# Patient Record
Sex: MALE | Race: WHITE
[De-identification: names, ages, dates, MRNs, and addresses within clinical notes are randomized per-mention and may not be internally consistent; named-entity substitution may affect disease eponyms.]

---

## 2017-06-09 ENCOUNTER — HOSPITAL ENCOUNTER (OUTPATIENT)
Dept: HOSPITAL 80 - FIMAGING | Age: 82
Discharge: HOME | End: 2017-06-09
Attending: INTERNAL MEDICINE
Payer: COMMERCIAL

## 2017-06-09 DIAGNOSIS — Z95.1: ICD-10-CM

## 2017-06-09 DIAGNOSIS — I25.10: ICD-10-CM

## 2017-06-09 DIAGNOSIS — M48.06: ICD-10-CM

## 2017-06-09 DIAGNOSIS — M54.16: Primary | ICD-10-CM

## 2017-06-09 DIAGNOSIS — M54.32: ICD-10-CM

## 2017-06-09 PROCEDURE — 3E0R3KZ INTRODUCTION OF OTHER DIAGNOSTIC SUBSTANCE INTO SPINAL CANAL, PERCUTANEOUS APPROACH: ICD-10-PCS | Performed by: RADIOLOGY

## 2017-06-10 ENCOUNTER — HOSPITAL ENCOUNTER (INPATIENT)
Dept: HOSPITAL 80 - FED | Age: 82
LOS: 2 days | Discharge: HOME | DRG: 249 | End: 2017-06-12
Attending: INTERNAL MEDICINE | Admitting: INTERNAL MEDICINE
Payer: COMMERCIAL

## 2017-06-10 DIAGNOSIS — I10: ICD-10-CM

## 2017-06-10 DIAGNOSIS — K21.9: ICD-10-CM

## 2017-06-10 DIAGNOSIS — L97.509: ICD-10-CM

## 2017-06-10 DIAGNOSIS — R73.9: ICD-10-CM

## 2017-06-10 DIAGNOSIS — I48.91: ICD-10-CM

## 2017-06-10 DIAGNOSIS — I21.4: Primary | ICD-10-CM

## 2017-06-10 DIAGNOSIS — Z95.1: ICD-10-CM

## 2017-06-10 DIAGNOSIS — I44.7: ICD-10-CM

## 2017-06-10 DIAGNOSIS — N40.0: ICD-10-CM

## 2017-06-10 DIAGNOSIS — I25.10: ICD-10-CM

## 2017-06-10 DIAGNOSIS — E78.5: ICD-10-CM

## 2017-06-10 LAB
% IMMATURE GRANULYOCYTES: 0.4 % (ref 0–1.1)
% IMMATURE GRANULYOCYTES: 0.6 % (ref 0–1.1)
ABSOLUTE IMMATURE GRANULOCYTES: 0.06 10^3/UL (ref 0–0.1)
ABSOLUTE IMMATURE GRANULOCYTES: 0.08 10^3/UL (ref 0–0.1)
ABSOLUTE NRBC COUNT: 0 10^3/UL (ref 0–0.01)
ABSOLUTE NRBC COUNT: 0 10^3/UL (ref 0–0.01)
ADD DIFF?: NO
ADD DIFF?: NO
ADD MORPH?: NO
ADD MORPH?: NO
ADD SCAN?: NO
ADD SCAN?: NO
ANION GAP SERPL CALC-SCNC: 12 MEQ/L (ref 8–16)
APTT BLD: 27.9 SEC (ref 23–38)
ATYPICAL LYMPHOCYTE FLAG: 0 (ref 0–99)
ATYPICAL LYMPHOCYTE FLAG: 10 (ref 0–99)
CALCIUM SERPL-MCNC: 9.8 MG/DL (ref 8.5–10.4)
CHLORIDE SERPL-SCNC: 102 MEQ/L (ref 97–110)
CO2 SERPL-SCNC: 24 MEQ/L (ref 22–31)
CREAT SERPL-MCNC: 1.3 MG/DL (ref 0.7–1.3)
ERYTHROCYTE [DISTWIDTH] IN BLOOD BY AUTOMATED COUNT: 13.2 % (ref 11.5–15.2)
ERYTHROCYTE [DISTWIDTH] IN BLOOD BY AUTOMATED COUNT: 13.2 % (ref 11.5–15.2)
FRAGMENT RBC FLAG: 0 (ref 0–99)
FRAGMENT RBC FLAG: 0 (ref 0–99)
GFR SERPL CREATININE-BSD FRML MDRD: 52 ML/MIN/{1.73_M2}
GLUCOSE SERPL-MCNC: 122 MG/DL (ref 70–100)
HCT VFR BLD CALC: 43.7 % (ref 40–51)
HCT VFR BLD CALC: 46.6 % (ref 40–51)
HGB BLD-MCNC: 14.6 G/DL (ref 13.7–17.5)
HGB BLD-MCNC: 15.5 G/DL (ref 13.7–17.5)
INR PPP: 1.05 (ref 0.83–1.16)
LEFT SHIFT FLG: 0 (ref 0–99)
LEFT SHIFT FLG: 0 (ref 0–99)
LIPEMIA HEMOLYSIS FLAG: 80 (ref 0–99)
LIPEMIA HEMOLYSIS FLAG: 80 (ref 0–99)
MCH RBC BLDCO QN: 30.5 PG (ref 27.9–34.1)
MCH RBC BLDCO QN: 30.6 PG (ref 27.9–34.1)
MCHC RBC AUTO-ENTMCNC: 33.3 G/DL (ref 32.4–36.7)
MCHC RBC AUTO-ENTMCNC: 33.4 G/DL (ref 32.4–36.7)
MCV RBC AUTO: 91.4 FL (ref 81.5–99.8)
MCV RBC AUTO: 91.9 FL (ref 81.5–99.8)
NRBC-AUTO%: 0 % (ref 0–0.2)
NRBC-AUTO%: 0 % (ref 0–0.2)
PLATELET # BLD: 260 10^3/UL (ref 150–400)
PLATELET # BLD: 294 10^3/UL (ref 150–400)
PLATELET CLUMPS FLAG: 0 (ref 0–99)
PLATELET CLUMPS FLAG: 0 (ref 0–99)
PMV BLD AUTO: 9.3 FL (ref 8.7–11.7)
PMV BLD AUTO: 9.9 FL (ref 8.7–11.7)
POTASSIUM SERPL-SCNC: 4.8 MEQ/L (ref 3.5–5.2)
PROTHROMBIN TIME: 13.6 SEC (ref 12–15)
RBC # BLD AUTO: 4.78 10^6/UL (ref 4.4–6.38)
RBC # BLD AUTO: 5.07 10^6/UL (ref 4.4–6.38)
SODIUM SERPL-SCNC: 138 MEQ/L (ref 134–144)
TROPONIN I SERPL-MCNC: 0.07 NG/ML (ref 0–0.03)

## 2017-06-10 PROCEDURE — C1887 CATHETER, GUIDING: HCPCS

## 2017-06-10 PROCEDURE — G0378 HOSPITAL OBSERVATION PER HR: HCPCS

## 2017-06-10 PROCEDURE — C1884 EMBOLIZATION PROTECT SYST: HCPCS

## 2017-06-10 PROCEDURE — C1876 STENT, NON-COA/NON-COV W/DEL: HCPCS

## 2017-06-10 PROCEDURE — C1769 GUIDE WIRE: HCPCS

## 2017-06-10 PROCEDURE — C1760 CLOSURE DEV, VASC: HCPCS

## 2017-06-10 RX ADMIN — PANTOPRAZOLE SODIUM SCH MG: 40 TABLET, DELAYED RELEASE ORAL at 18:07

## 2017-06-10 RX ADMIN — EZETIMIBE SCH MG: 10 TABLET ORAL at 18:07

## 2017-06-10 RX ADMIN — THERA TABS SCH EACH: TAB at 18:07

## 2017-06-10 RX ADMIN — TAMSULOSIN HYDROCHLORIDE SCH MG: 0.4 CAPSULE ORAL at 18:07

## 2017-06-10 RX ADMIN — Medication SCH MG: at 18:07

## 2017-06-10 RX ADMIN — ATORVASTATIN CALCIUM SCH MG: 20 TABLET, FILM COATED ORAL at 18:07

## 2017-06-10 RX ADMIN — MOMETASONE FUROATE SCH PUFFS: 220 INHALANT RESPIRATORY (INHALATION) at 20:42

## 2017-06-10 RX ADMIN — DIGOXIN SCH MCG: 125 TABLET ORAL at 18:07

## 2017-06-10 RX ADMIN — ASPIRIN SCH MG: 325 TABLET, FILM COATED ORAL at 18:07

## 2017-06-10 RX ADMIN — CARVEDILOL SCH MG: 6.25 TABLET, FILM COATED ORAL at 18:08

## 2017-06-10 RX ADMIN — CARVEDILOL SCH MG: 6.25 TABLET, FILM COATED ORAL at 18:07

## 2017-06-10 NOTE — CPEKG
Heart Rate: 58

RR Interval: 1034

P-R Interval: 216

QRSD Interval: 136

QT Interval: 404

QTC Interval: 397

P Axis: -7

QRS Axis: -59

T Wave Axis: 152

EKG Severity - ABNORMAL ECG -

EKG Impression: SINUS RHYTHM

EKG Impression: VENTRICULAR TRIGEMINY

EKG Impression: LEFT BUNDLE BRANCH BLOCK

Electronically Signed By: Glen Miles 10-Tyler-2017 11:55:39

## 2017-06-10 NOTE — PDCARCONS
Cardiology Consult


Reason for Consult: Chest pain


Chief Complaint: Chest pain


Requesting Physician: Dr. Antoine


History of Present Illness: 





86 M with prior h.o. CABG and MV repair.


This AM developed chest pain, described as left precordial.  8/10, no 

radiation.  No relieving factors.


Currently has mild to no pain (1/10), resting comfortably in bed.


I was called ~ 7PM by Dr. Antoine requesting consult on this patient due to 

positive troponin.


Wife is in room at time of this interview





History Information





- Allergies/Home Medication List


Allergies/Adverse Reactions: 








codeine Allergy (Verified 07/02/14 21:11)


 





Home Medications: 








Digoxin [Lanoxin 0.125 mg] 250 mcg PO MOTUWETHFRSA@1800 07/29/11 [Last Taken 06/ 09/17]


Nitroglycerin [Nitrostat 0.4 mg (*)] 0.4 mg SL PRN PRN 07/29/11 [Last Taken 06/

10/17]


Omeprazole [Prilosec 20 mg] 20 mg PO DAILY@1800 07/29/11 [Last Taken 06/09/17]


Tamsulosin HCl [Flomax 0.4 MG (*)] 0.4 mg PO DAILY@1800 07/29/11 [Last Taken 06/ 09/17]


Aspirin [Aspirin 325 mg (*)] 325 mg PO DAILY@1800 07/02/14 [Last Taken 06/09/17]


Mometasone 220Mcg Inhaler [Asmanex Inh (*)] 2 puffs IH BID 07/02/14 [Last Taken 

06/10/17]


Multivitamins [Multivitamin (*)] 1 each PO DAILY@1800 07/02/14 [Last Taken 06/09 /17]


Calcium Carb W/Vit D [Calcium Carb W/Vit D 500/200 (*)] 1,000 mg PO DAILY@18 06/

10/17 [Last Taken 06/09/17]


Carvedilol [Coreg (*)] 6.25 mg PO DAILY 06/10/17 [Last Taken 06/10/17]


Carvedilol [Coreg (*)] 12.5 mg PO DAILY@18 06/10/17 [Last Taken 06/09/17]


Ezetimibe/Simvastatin [Ezetimibe-Simvastatin 10-40 mg] 1 each PO DAILY@18 06/10/

17 [Last Taken 06/09/17]


Lisinopril [Zestril 5 mg (*)] 5 mg PO MWF@0900 06/10/17 [Last Taken 06/09/17]





I have personally reviewed and updated: family history, medical history, social 

history (d)





- Past Medical History


coronary artery disease





- Surgical History


Reports: coronary bypass surgery





- Social History


Smoking Status: Never smoked





Cardiac History





- Cardiac History


Past Cardiac History: CABG (MV repair)





Physical Exam














Temp Pulse Resp BP Pulse Ox


 


 36.6 C   54 L  20   138/72 H  98 


 


 06/10/17 20:00  06/10/17 20:00  06/10/17 20:00  06/10/17 20:00  06/10/17 20:00




















O2 (L/minute)                  2














Constitutional: no apparent distress, appears nourished, not in pain


Eyes: PERRL, anicteric sclera


Ears, Nose, Mouth, Throat: moist mucous membranes (dd), hard of hearing


Cardiovascular: regular rate and rhythym, no murmur, rub, or gallop


Respiratory: no respiratory distress


Gastrointestinal: normoactive bowel sounds, soft, non-tender abdomen


Skin: warm


Neurologic: AAOx3


Psychiatric: interacting appropriately





Lab and Imaging





 06/10/17 Unknown





 06/10/17 Unknown














WBC  13.74 10^3/uL (3.80-9.50)  H  06/10/17  Unknown


 


RBC  5.07 10^6/uL (4.40-6.38)   06/10/17  Unknown


 


Hgb  15.5 g/dL (13.7-17.5)   06/10/17  Unknown


 


Hct  46.6 % (40.0-51.0)   06/10/17  Unknown


 


MCV  91.9 fL (81.5-99.8)   06/10/17  Unknown


 


MCH  30.6 pg (27.9-34.1)   06/10/17  Unknown


 


MCHC  33.3 g/dL (32.4-36.7)   06/10/17  Unknown


 


RDW  13.2 % (11.5-15.2)   06/10/17  Unknown


 


Plt Count  294 10^3/uL (150-400)   06/10/17  Unknown


 


MPV  9.9 fL (8.7-11.7)   06/10/17  Unknown


 


Neut % (Auto)  62.8 % (39.3-74.2)   06/10/17  Unknown


 


Lymph % (Auto)  19.3 % (15.0-45.0)   06/10/17  Unknown


 


Mono % (Auto)  13.5 % (4.5-13.0)  H  06/10/17  Unknown


 


Eos % (Auto)  3.5 % (0.6-7.6)   06/10/17  Unknown


 


Baso % (Auto)  0.5 % (0.3-1.7)   06/10/17  Unknown


 


Nucleat RBC Rel Count  0.0 % (0.0-0.2)   06/10/17  Unknown


 


Absolute Neuts (auto)  8.63 10^3/uL (1.70-6.50)  H  06/10/17  Unknown


 


Absolute Lymphs (auto)  2.65 10^3/uL (1.00-3.00)   06/10/17  Unknown


 


Absolute Monos (auto)  1.85 10^3/uL (0.30-0.80)  H  06/10/17  Unknown


 


Absolute Eos (auto)  0.48 10^3/uL (0.03-0.40)  H  06/10/17  Unknown


 


Absolute Basos (auto)  0.07 10^3/uL (0.02-0.10)   06/10/17  Unknown


 


Absolute Nucleated RBC  0.00 10^3/uL (0-0.01)   06/10/17  Unknown


 


Immature Gran %  0.4 % (0.0-1.1)   06/10/17  Unknown


 


Immature Gran #  0.06 10^3/uL (0.00-0.10)   06/10/17  Unknown


 


PT  13.6 SEC (12.0-15.0)   06/10/17  19:00    


 


INR  1.05  (0.83-1.16)   06/10/17  19:00    


 


APTT  27.9 SEC (23.0-38.0)   06/10/17  19:00    


 


Sodium  138 mEq/L (134-144)   06/10/17  Unknown


 


Potassium  4.8 mEq/L (3.5-5.2)   06/10/17  Unknown


 


Chloride  102 mEq/L ()   06/10/17  Unknown


 


Carbon Dioxide  24 mEq/l (22-31)   06/10/17  Unknown


 


Anion Gap  12 mEq/L (8-16)   06/10/17  Unknown


 


BUN  18 mg/dL (7-23)   06/10/17  Unknown


 


Creatinine  1.3 mg/dL (0.7-1.3)   06/10/17  Unknown


 


Estimated GFR  52   06/10/17  Unknown


 


Glucose  122 mg/dL ()  H  06/10/17  Unknown


 


Calcium  9.8 mg/dL (8.5-10.4)   06/10/17  Unknown


 


Troponin I  0.069 ng/mL (0-0.034)  H  06/10/17  Unknown


 


NT-Pro-B Natriuret Pep  1540 pg/mL (0-450)  H  06/10/17  Unknown








Visualized and Interpreted EKG results: Yes


EKG additional interpertation: NSR, IVCD with lateral STT changes (ST 

depression new, QRS wider than prior ECG)





A/P


Assessment: 





CAD


NSTEMI


Recent attempted epidural injection by IR with breach of subarachnoid space





Plan: 





-Continue ASA


-I have talked with neurologist Dr. Burden.  Have paged IR but not heard 

back from them.  Acc to neurology if patient unstable, OK to use heparin + dual 

antiplatelet.  Will hold off on heparin for now since patient stable and almost 

pain free.  Will keep NPO for now, anticipating cor angio in AM


-ASA for now


-Start low dose BB





Have d.w. Dr. Antoine, Dr. Jarvis, Dr. Burden and patient's nurse

## 2017-06-10 NOTE — GHP
[f rep st]



                                                            HISTORY AND PHYSICAL





DATE OF ADMISSION:  06/10/2017



CHIEF COMPLAINT:  Chest pain.



HISTORY:  This is an 86-year-old man who has a past medical history of coronary artery disease statu
s post CABG, as well as a chronic wound of his right foot, who presents with the abrupt onset of mayra
st pain this morning.  He notes he was not doing anything particular that he can remember when he de
veloped left-sided chest pain.  He notes it was more over his pectoral muscle than over his sternum.
  It did not radiate anywhere.  It began mildly, but then rapidly became severe.  He rated it up to 
a 9/10.  He did have some associated nausea and diaphoresis, as well as a sense of doom.  He notes t
hat when it started initially thought it was indigestion, and when he burped it did improve somewhat
, but then it came back again more significantly.  He took a nitroglycerin which he carries with him
 and this had no change in his symptoms.  He then chewed an aspirin.  He notes at this time the pain
 is still slightly present, but is much reduced, more like a 2/10.  He denies ever having similar sy
mptoms in the past.  He otherwise has felt well recently and the only other concern that he mentions
 is that he did undergo a lumbar injection for sciatic pain yesterday that had to be canceled as the
 needle was placed into the subarachnoid space.



PAST MEDICAL HISTORY:  

1.  Coronary artery disease.

2.  Valvular heart disease.

3.  Atrial fibrillation.

4.  Chronic sciatic pain.

5.  Chronic foot wound.



PAST SURGICAL HISTORY:  

1.  CABG x2.

2.  Mitral valve repair.

3.  Hernia repair.



SOCIAL HISTORY:  The patient is .  He is a nonsmoker, nondrinker, non-drug user.  He is quite
 active.



FAMILY HISTORY:  Noncontributory.



REVIEW OF SYSTEMS:  A 10-point review of systems was obtained and was negative, except as per HPI.



HOME MEDICATIONS:  

1.  Tamsulosin.

2.  Omeprazole.

3.  Nitroglycerin.

4.  Multivitamin.

5.  Mometasone.

6.  Lisinopril.

7.  Ezetimibe/simvastatin.

8.  Digoxin.

9.  Carvedilol.

10.  Calcium and vitamin D.  

11.  Aspirin.



ALLERGIES:  Include codeine.



PHYSICAL EXAMINATION:  VITAL SIGNS:  Blood pressure 132/70, heart rate 62, respiratory rate 19, O2 s
aturations 100% on room air, temperature is 36.4.  GENERAL APPEARANCE:  This is an elderly man.  He 
appears somewhat younger than stated age.  He is awake and alert.  In no acute distress.  HEENT:  Klaus
es:  Anicteric.  ENT:  Oropharynx clear, moist mucous membranes, JVD not elevated.  CARDIOVASCULAR: 
 Regular rate and rhythm.  He does have a systolic murmur present, most prominently at the left uppe
r sternal border.  PULMONARY:  Lung are clear to auscultation bilaterally.  ABDOMEN:  Soft, nontende
r, nondistended.  EXTREMITIES:  No clubbing, cyanosis, or edema.  SKIN:  Warm, dry, well perfused. 

NEURO/PSYCH:  The patient is oriented, appropriate, he is pleasant.



CLINICAL DATA:  Labs reviewed and notable for:

1.  White blood cell count of 13.7.  Troponin is 0.069.  ProBNP of 1500.  Glucose is 122.

2.  EKG personally reviewed and interpreted shows sinus rhythm with a rate of 58.  There is a left b
undle branch block and ventricular trigeminy.

3.  Chest x-ray:  This was personally reviewed and interpreted and shows no acute findings.



ASSESSMENT AND PLAN:  An 86-year-old man with a history of coronary artery disease, presenting with 
chest pain.

1.  Chest pain:  Concerning for acute coronary syndrome, including unstable angina given history and
 that his initial troponin was mildly elevated.  He will be admitted for acute coronary syndrome rul
e out with serial troponins, EKGs, and telemetry monitoring.  Cardiology will be consulted and can d
ecide in the morning if he is more appropriate for coronary angiography versus a stress test.  If hi
s chest pain recurs, we will start heparin drip overnight.

2.  Coronary artery disease:  As per above, this has not been an issue for the patient for quite nancy
e time.  He states at least since his CABG which was many years ago.  He is on statin, aspirin, and 
beta blocker at home, which will be continued.  

3.  Atrial fibrillation:  EKG here personally reviewed interpreted showing sinus rhythm with left bu
ndle branch block.  He is on both a beta-blocker and digoxin, which will be continued.  He is not ch
ronically anticoagulated for this, other than full-dose aspirin, however.  He does have a [id number]
Sc score of 4 and anticoagulation is likely indicated if he can tolerate.

4.  Chronic foot wound:  He has been followed both by Podiatry and previously by the [facility].  We will ask for a Wound consultation while inhouse.  

5.  Hyperglycemia without a history of diabetes:  We will check a hemoglobin A1c.

6.  Gastroesophageal reflux disease:  We will continue PPI.

7.  Disposition:  Observation status.  Should he in fact have acute coronary syndrome, he will requi
re a change to inpatient status at that point.

8.  Code status:  Patient states he would like to be full code.  His wife would be his decision make
r should he be unable to make decisions on his own. 

The patient is new to my care.  Old records reviewed, summarized as per HPI and past medical history
.  Care plan reviewed with ER physician, including plans for monitoring on telemetry.





Job #:  [id number]

## 2017-06-11 LAB
% IMMATURE GRANULYOCYTES: 0.6 % (ref 0–1.1)
ABSOLUTE IMMATURE GRANULOCYTES: 0.09 10^3/UL (ref 0–0.1)
ABSOLUTE NRBC COUNT: 0 10^3/UL (ref 0–0.01)
ADD DIFF?: NO
ADD MORPH?: NO
ADD SCAN?: NO
ANION GAP SERPL CALC-SCNC: 5 MEQ/L (ref 8–16)
ATYPICAL LYMPHOCYTE FLAG: 0 (ref 0–99)
CALCIUM SERPL-MCNC: 9.6 MG/DL (ref 8.5–10.4)
CHLORIDE SERPL-SCNC: 102 MEQ/L (ref 97–110)
CHOLEST SERPL-MCNC: 105 MG/DL (ref 140–220)
CHOLEST/HDLC SERPL: 4.04 RATIO (ref 1–4.97)
CO2 SERPL-SCNC: 29 MEQ/L (ref 22–31)
CREAT SERPL-MCNC: 1.3 MG/DL (ref 0.7–1.3)
DIGOXIN SERPL-MCNC: 2 NG/ML (ref 0.8–2)
ERYTHROCYTE [DISTWIDTH] IN BLOOD BY AUTOMATED COUNT: 13.3 % (ref 11.5–15.2)
FRAGMENT RBC FLAG: 0 (ref 0–99)
GFR SERPL CREATININE-BSD FRML MDRD: 52 ML/MIN/{1.73_M2}
GLUCOSE SERPL-MCNC: 95 MG/DL (ref 70–100)
HCT VFR BLD CALC: 43.6 % (ref 40–51)
HGB BLD-MCNC: 14.6 G/DL (ref 13.7–17.5)
HIGH DENSITY LIPOPROTEIN: 26 MG/DL (ref 40–65)
LDLC/HDLC SERPL: 1.54 RATIO (ref 1–3.64)
LEFT SHIFT FLG: 0 (ref 0–99)
LIPEMIA HEMOLYSIS FLAG: 80 (ref 0–99)
LOW DENSITY LIPOPROTEIN: 40 MG/DL (ref 80–100)
MCH RBC BLDCO QN: 30.9 PG (ref 27.9–34.1)
MCHC RBC AUTO-ENTMCNC: 33.5 G/DL (ref 32.4–36.7)
MCV RBC AUTO: 92.4 FL (ref 81.5–99.8)
NON-HIGH DENSITY LIPOPROTEIN: 79 MG/DL (ref 90–129)
NRBC-AUTO%: 0 % (ref 0–0.2)
PLATELET # BLD: 253 10^3/UL (ref 150–400)
PLATELET CLUMPS FLAG: 0 (ref 0–99)
PMV BLD AUTO: 10.2 FL (ref 8.7–11.7)
POTASSIUM SERPL-SCNC: 4.9 MEQ/L (ref 3.5–5.2)
RBC # BLD AUTO: 4.72 10^6/UL (ref 4.4–6.38)
SODIUM SERPL-SCNC: 136 MEQ/L (ref 134–144)
TRIGL SERPL-MCNC: 198 MG/DL (ref 40–150)
VERY LOW DENSITY LIPOPROTEINS: 39 MG/DL (ref 8–25)

## 2017-06-11 PROCEDURE — B2111ZZ FLUOROSCOPY OF MULTIPLE CORONARY ARTERIES USING LOW OSMOLAR CONTRAST: ICD-10-PCS | Performed by: INTERNAL MEDICINE

## 2017-06-11 PROCEDURE — 02703DZ DILATION OF CORONARY ARTERY, ONE ARTERY WITH INTRALUMINAL DEVICE, PERCUTANEOUS APPROACH: ICD-10-PCS | Performed by: INTERNAL MEDICINE

## 2017-06-11 PROCEDURE — 4A023N7 MEASUREMENT OF CARDIAC SAMPLING AND PRESSURE, LEFT HEART, PERCUTANEOUS APPROACH: ICD-10-PCS | Performed by: INTERNAL MEDICINE

## 2017-06-11 RX ADMIN — TAMSULOSIN HYDROCHLORIDE SCH MG: 0.4 CAPSULE ORAL at 17:25

## 2017-06-11 RX ADMIN — PANTOPRAZOLE SODIUM SCH MG: 40 TABLET, DELAYED RELEASE ORAL at 17:25

## 2017-06-11 RX ADMIN — MOMETASONE FUROATE SCH PUFFS: 220 INHALANT RESPIRATORY (INHALATION) at 08:46

## 2017-06-11 RX ADMIN — THERA TABS SCH EACH: TAB at 17:25

## 2017-06-11 RX ADMIN — CARVEDILOL SCH MG: 6.25 TABLET, FILM COATED ORAL at 17:25

## 2017-06-11 RX ADMIN — ATORVASTATIN CALCIUM SCH MG: 20 TABLET, FILM COATED ORAL at 17:25

## 2017-06-11 RX ADMIN — MOMETASONE FUROATE SCH PUFFS: 220 INHALANT RESPIRATORY (INHALATION) at 19:20

## 2017-06-11 RX ADMIN — ASPIRIN SCH MG: 325 TABLET, FILM COATED ORAL at 17:25

## 2017-06-11 RX ADMIN — EZETIMIBE SCH MG: 10 TABLET ORAL at 17:25

## 2017-06-11 RX ADMIN — DIGOXIN SCH MCG: 125 TABLET ORAL at 17:24

## 2017-06-11 RX ADMIN — Medication SCH MG: at 17:24

## 2017-06-11 NOTE — SOAPPROG
SOAP Progress Note


Assessment/Plan: 


Assessment:





1.  Non STEMI myocardial infarction with troponin of 20.


2. Coronary disease status post 2 vessel coronary bypass grafting.


3. History of mitral valve repair.


4.  Paroxysmal Atrial fibrillation





Impression:  Troponin continues to increase now at 25 with history of 

stuttering chest pain/angina yesterday.  Per Dr. Whalen and interventional 

Radiology and Neurology patient has been cleared for use of anticoagulation 

including sustained dual antiplatelet therapy.  Will plan for diagnostic/

therapeutic angiogram this morning.  Risks and benefits of this approach were 

discussed with the patient his wife.  We will proceed.  Pending results 

considerations for further medical therapy.











06/11/17 10:20





Subjective: 


Minimal discomfort overnight.  This morning he is free of shortness of breath 

PND orthopnea.





Objective: 





Medications











Generic Name Dose Route Start Last Admin





  Trade Name Freq  PRN Reason Stop Dose Admin


 


Aspirin  325 mg  06/10/17 18:00  06/10/17 18:07





  Aspirin  PO  12/07/17 17:59  325 mg





  DAILY@1800 [facility]   


 


Carvedilol  12.5 mg  06/10/17 18:00  06/10/17 18:08





  Coreg  PO  12/07/17 17:59  12.5 mg





  DAILY@18 [facility]   


 


Atorvastatin Calcium  20 mg  06/10/17 18:00  06/10/17 18:07





  Lipitor  PO  12/07/17 17:59  20 mg





  DAILY@1800 [facility]   


 


Carvedilol  6.25 mg  06/11/17 09:00  06/10/17 18:07





  Coreg  PO  12/08/17 08:59  6.25 mg





  DAILY [facility]   


 


Digoxin  250 mcg  06/10/17 18:00  06/10/17 18:07





  Lanoxin  PO  12/07/17 17:59  250 mcg





  MOTUWETHFRSA@1800 [facility]   


 


Ezetimibe  10 mg  06/10/17 18:00  06/10/17 18:07





  Zetia  PO  12/07/17 17:59  10 mg





  DAILY@1800 [facility]   


 


Lisinopril  5 mg  06/12/17 09:00  





  Zestril  PO  12/09/17 08:59  





  MWF@0900 [facility]   











 Vital Signs











Temp Pulse Resp BP Pulse Ox


 


 36.8 C   58 L  16   110/58 L  91 L


 


 06/11/17 08:30  06/11/17 08:30  06/11/17 08:30  06/11/17 08:30  06/11/17 08:30








 Laboratory Results





 06/11/17 04:00 





 06/11/17 04:00 





 











 06/10/17 06/11/17 06/12/17





 05:59 05:59 05:59


 


Intake Total  700 


 


Balance  700 








 











PT  13.6 SEC (12.0-15.0)   06/10/17  19:00    


 


INR  1.05  (0.83-1.16)   06/10/17  19:00    











 Laboratory Tests











  06/10/17 06/10/17 06/11/17





  11:00 17:20 00:05


 


Troponin I  0.069 H  7.880 H  25.200 H


 


NT-Pro-B Natriuret Pep  1540 H  


 


LDL Cholesterol, Calc   














  06/11/17





  04:00


 


Troponin I 


 


NT-Pro-B Natriuret Pep 


 


LDL Cholesterol, Calc  40 L














Physical Exam





- Physical Exam


General Appearance: alert, no apparent distress


EENT: PERRL/EOMI


Neck: non-tender, full range of motion


Respiratory: chest non-tender, lungs clear


Cardiac/Chest: normal peripheral pulses, regular rate, rhythm, No edema, No 

gallop, No JVD


Peripheral Pulses: 1+: carotid (R), carotid (L), dorsalis-pedis (R), dorsalis-

pedis (L), 2+: femoral (R), femoral (L)


Abdomen: normal bowel sounds, non-tender


Back: Normal inspection


Skin: normal color, warm/dry, No rash


Lymphatic: no adenopathy


Extremities: normal range of motion, non-tender, No pedal edema


Neuro/Psych: no motor/sensory deficits, alert, normal mood/affect, oriented x 3





ICD10 Worksheet


Patient Problems: 


 Problems











Problem Status Onset


 


Cellulitis and abscess of foot Acute  


 


Chest pain Acute  














Review of Systems





- Review of Systems


Constitutional: denies: chills, fever


EENTM: no symptoms reported


Respiratory: no symptoms reported


Cardiac: no symptoms reported, chest pain.  denies: irregular heart rate, 

lightheadedness


Gastrointestinal/Abdominal: no symptoms reported


Genitourinary: no symptoms


Musculoskelatal: no symptoms


Skin: no symptoms


Neurological: no symptoms


Hematologic/Lymphatic: no symptoms reported


Immunologic/allergic: no symptoms reported





Past Medical History





- Personal History


Current Tetanus/Diphtheria Vaccine: No


Current Tetanus Diphtheria and Acellular Pertussis (TDAP): No


Tetanus Vaccine Date: 2005





- Medical/Surgical History


Hx Asthma: Yes


Hx Chronic Respiratory Disease: No


Hx Cardiac Disease: Yes


Hx Diabetes: No


Hx Renal Disease: No


Hx Alcoholism: No


Hx Cirrhosis: No


Hx HIV/AIDS: No


Hx Splenectomy or Spleen Trauma: No


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





- Social History


Smoking Status: Never smoked

## 2017-06-11 NOTE — CPEKG
Heart Rate: 79

RR Interval: 759

QRSD Interval: 136

QT Interval: 444

QTC Interval: 510

QRS Axis: -62

T Wave Axis: 119

EKG Severity - ABNORMAL ECG -

EKG Impression: A-FLUTTER W/ PREDOM 3:1 AV BLOCK, A-RATE 245



EKG Impression: LEFT BUNDLE BRANCH BLOCK

Electronically Signed By: Darshan Mccormack 11-Jun-2017 18:33:41

## 2017-06-11 NOTE — PDDXCAT
Diagnostic Cath Note





- .


Date: 06/11/17


: Matheus


Indication: other (Non-Q-wave myocardial infarction)





- Procedure


Access: right groin


Procedure: left heart catheterization, coronary angiography





- Materials


Left Heart Cath size: 6F


Left Heart Cath materials: standard multipack (JL4, JR4, pigtail)





- Findings-Left Heart Catheterization


LM: Unobstructed


LAD: 100% occluded


LCX: Unobstructed


RCA: Dominant:  Unobstructed with luminal irregularities


Ramus: 100% occluded


rSVG: Vein graft to the ramus intermedius with subtotal stenosis in the body of 

the graft with thrombus seen distally.


LIMA: Widely patent to the LAD


Complications: None


Estimated blood loss: <50ml


Closure method: Angioseal


Assessment: NSTEMI myocardial infarction with thrombotic occlusion of a 

degenerative vein graft to the ramus.  Patent mammary artery to the LAD.  

Patent native right coronary artery and circumflex.


Plan: 





Urgent PCI of the vein graft.


Intervention: 





Procedure:  PCI and stenting of the degenerated vein graft to the ramus 

intermedius with filter wire support.


After reviewing diagnostic angiogram shows like to proceed with urgent PCI.  

Patient was anticoagulated with heparin.  Therapeutic ACT was confirmed.  A 6 

[place] left coronary bypass grafting catheter guide catheter was used to 

intubate the vein graft.  Using a 0.014 2.5-3.5 filter wire the vein graft 

stenosis was crossed the filter wire basket was deployed distally.  The lesion 

was primarily stented with a 2.75 x 32 mm Rebel  bare metal stent.  Filter wire 

was withdrawn.  Patient was administered intra of vein graft nitroglycerin.  

Initially following procedure there was spasm of the distal ramus.  This 

resolved with nitroglycerin.  Final orthogonal angiograms revealed ALTAGRACIA grade 3 

flow with no complication.


Conclusions:


Non STEMI myocardial infarction with occlusion of the vein graft to the ramus 

intermedius status post successful PCI and stenting with a bare metal stent.  

Dual antiplatelet therapy required for 14-30 days.  Aggressive secondary 

prevention to continue.


Patient Problems: 


 Problems











Problem Status Onset


 


Chest pain Acute  


 


Cellulitis and abscess of foot Acute

## 2017-06-11 NOTE — CPEKG
Heart Rate: 59

RR Interval: 1017

P-R Interval: 242

QRSD Interval: 138

QT Interval: 412

QTC Interval: 409

P Axis: -40

QRS Axis: -57

T Wave Axis: 143

EKG Severity - ABNORMAL ECG -

EKG Impression: SINUS RHYTHM

EKG Impression: MULTIPLE VENTRICULAR PREMATURE COMPLEXES

EKG Impression: FIRST DEGREE AV BLOCK

EKG Impression: LEFT BUNDLE BRANCH BLOCK

Electronically Signed By: Darshan Mccormack 11-Jun-2017 09:50:47

## 2017-06-11 NOTE — WOCRNPDOC
WOCRN Advanced Assessment Note





- Skin Integrity Problem, Advanced Assess


  ** Right Foot


Dressing Type: Band Aid, Mepilex (non-bordered foam)


Dressing Description: Clean/Dry, Intact


Exudate Amount: Scant


Exudate Color: Reddish/Yellow


Exudate Characteristic(s): Serosanguinous


Integumentary Issue Intervention: Visualized Under Dressing


Laurie Wound Tissue: Hyperkeratotic


Laurie Wound Swelling: None


Wound Bed Color: Red


Wound Bed Constitution: Smooth Tissue


Site Odor: None


Site Measurement - Head-to-Toe Length X Width X Depth (cm): R 1st metatarsal: 

0.8cmx0.2cmx0.2cm.  R 3rd metatarsal: 0.2cmx0.1cmx0.1cm


Skin Integrity Problem Comment: Two, discrete, pressure-related wounds noted on 

plantar aspect of patient's R foot. While the appearance is indicative of 

diabetic foot ulcer, patient does have bony deformity in this foot which may 

have resulted in pressure injuries from shoes/walking. He does not have a dx of 

diabetes, and A1C results are pending. He reports being to many podiatrists and 

to the wound healing center treatment, but also says no one has been able to 

help. He was referred to [facility] to be fitted for a specialty shoe that off-

loads these wounds, but says the shoes resulted in another injury to this foot. 

This author explained that going forward, these wounds will not heal unless the 

areas of pressure are off-loaded. He said he would be open to returning to 

[facility] after discharge for a re-evaluation of the shoes they made for him. 

Nursing may continue w/ existing dressing of Mepilex foam and Band-aid, 

changing q3 days and PRN. Wound care does not need to follow this patient 

ongoing. Report given to Staff CHELSEY Payton.

## 2017-06-11 NOTE — ECHO
[id number]

[id number]



+---------------------------------------------------+      [address]

:                                                   :       [place] [address]

:                                                   :           [phone]

+---------------------------------------------------+       Fax [phone]



                       Adult Echocardiographic Report



+----------------------------------------------------------------------------

----+

:Name: JERROD RAMESH LStudy Date: 2017 08:00 AM                         

    :

:MRN: [id number]     Hospital Admission Number: [id number] Location:

 202:

:: 1930     Gender: Male                           Height: 72 in    

    :

:Age: 86 yrs         Race: WH                               Weight: 147 lb   

    :

:Reason For Study: Chest pain/elevated troponin                              

    :

:                                                           BSA: 1.9 meters2 

    :

:History: CABG/MV repair                                                     

    :

+----------------------------------------------------------------------------

----+

MMode/2D Measurements \T\ Calculations

IVSd: 0.74 cm    LVIDd: 6.5 cm        FS: 34.4 %         Ao root diam:

LVPWd: 0.72 cm   LVIDs: 4.3 cm        EDV(Teich):        3.7 cm

                                      218.2 ml           LA dimension:

                                      ESV(Teich): 82.3 ml4.5 cm

                                      EF(Teich): 62.3 %

        _____________________________________________________________



LVOT diam: 2.6 cmLVLd ap4: 8.5 cm     SV(MOD-sp4):

LVOT area:       EDV(MOD-sp4):        74.0 ml

5.4 cm2          165.0 ml

                 LVLs ap4: 7.7 cm

                 ESV(MOD-sp4):

                 91.0 ml

                 EF(MOD-sp4): 44.8 %



Normal Measurement Values:

+----------------------------------------------------------------------------

----------------------------------+

:LVIDd (3.5-5.7cm)    IVSd (0.6-1.1cm)     LVPWd (0.6-1.1cm)     Aortic Root 

(2.0-3.7cm)Left Atrium (1.5-4.0cm):

:LV Vol(d) (76-115ml) LV Vol(s) (29-48ml)  Ejec Fraction (50-65%)PV Samuel (0.6-

1.2m/s)    TV Samuel (0.4-1.0m/s)    :

:MV E Samuel (0.8-1.0m/s)MV A Samuel (0.3-1.0m/s)LVOT Samuel (0.7-1.2m/s) Asc Ao Samuel (

0.9-1.8m/s)                       :

+----------------------------------------------------------------------------

----------------------------------+

Doppler Measurements \T\ Calculations

MV E max samuel:       MV V2 mean:      MV P1/2t max samuel:    Ao mean P.6 cm/sec        95.3 cm/sec      143.9 cm/sec         11.0 mmHg

MV A max samuel:       MV mean PG:      MV P1/2t: 199.0 msec Ao V2 mean:

120.4 cm/sec        4.1 mmHg         MVA(P1/2t): 1.1 cm2  154.7 cm/sec

MV E/A: 0.92        MV V2 VTI:       MV dec slope:        Ao V2 VTI:

MV dec time:        56.4 cm                               45.2 cm

0.64 sec            MVA(VTI): 1.5 vf6153.8 cm/sec2        BLACK(I,D): 1.9 cm2



        _____________________________________________________________

AI max samuel:         LV V1 max:       SV(LVOT): 85.4 ml

398.2 cm/sec        64.2 cm/sec

AI max PG:          LV V1 max P.4 mmHg           1.6 mmHg

AI dec slope:       LV V1 mean P.91 mmHg

170.6 cm/sec2       LV V1 mean:

AI P1/2t:           44.6 cm/sec

683.5 msec          LV V1 VTI:

                    15.7 cm



Left Ventricle

The left ventricle is mildly dilated. There is normal left ventricular wall

thickness. EF estimate is 40%. Septal motion is consistent with conduction

abnormality.







Right Ventricle

The right ventricle is normal in size and function.



Atria

The left atrium is mildly dilated. Right atrial size is normal. The

interatrial septum is intact with no evidence for an atrial septal defect.



Mitral Valve

There is mild mitral regurgitation. An annuloplasty ring is noted in the

mitral position.



Tricuspid Valve

Normal tricuspid valve. There is mild tricuspid regurgitation.



Aortic Valve

Severe AV calcification. The aortic valve is trileaflet. AV dimension less

index is .34. Mild valvular aortic stenosis. Mild aortic regurgitation.



Pulmonic Valve

The pulmonic valve is normal in structure and function. There is no pulmonic

valvular regurgitation.





Great Vessels

The aortic root is normal size.



Pericardium/Pleural

There is no pericardial effusion.



Conclusion

A complete two-dimensional transthoracic echocardiogram was performed (2D,

M-mode, Doppler and color flow Doppler).

The left ventricle is mildly dilated.

EF estimate is 40%.

Septal motion is consistent with conduction abnormality.

The left atrium is mildly dilated.

There is mild mitral regurgitation.

An annuloplasty ring is noted in the mitral position.

There is mild tricuspid regurgitation.

Severe AV calcification.

Mild valvular aortic stenosis.

Mild aortic regurgitation.





_____________________________________________________________________________





Final Reading Physician:

                        Angela Acevedo signed on 2017 11:39 AM

Ordering Physician: Froilan Barrientos

Performed By: Kalie Vera RDCS

## 2017-06-11 NOTE — HOSPPROG
Hospitalist Progress Note


Assessment/Plan: 





85 yo M with PMH of CAD s/p CABG presenting with chest pain found to have NSTEMI





# NSTEMI: with trop peaking at 25 but with sxs improved since admission 

yesterday. Taken to cath lab today and found to have stenosis of vein graft to 

ramus intermedius that is now s/p PCI with BMS placement. He is doing well  now 

post cath and has no current pain, HD stable, groin site clean and w/o 

significant pain or swelling. Monitoring overnight on tele. will need 14-30 

days of dual antiplatelet therapy after dc. 


# CAD: as above, aggressive secondary prevention also needed, lipid panel 

checked and found to have LDL of 40. Continue asa, coreg, atorvastatin and will 

add plavix for the next 2-4 weeks. 


# a fib/flutter: most recent ecg personally reviewed and appears c/w flutter 

with underlying LBBB, rate controlled on BB/digoxin. He has not been on AC 

previously but has CHADS-vasc of 4 and this should be considered as an OP. Will 

defer to cardiology.


# chronic foot wound: appreciate wound care eval, will continue f/u with 

podiatry/wound clinic and current dressing changes


# htn: bp well controlled on lisinopril/coreg


# HLD: continue statin/zetia


# BPH: continue tamsulosin


# IP status, will need > 48 hours stay for eval/mgmt of above


Care plan reviewed with cardiology. Reviewed care plan with patients wife 

present at bedside.  


Subjective: no significant overnight events, went to cath today, now chest pain 

free and feeling well


Objective: 


 Vital Signs











Temp Pulse Resp BP Pulse Ox


 


 36.6 C   78   12   113/58 L  93 


 


 06/11/17 16:28  06/11/17 16:28  06/11/17 16:28  06/11/17 16:28  06/11/17 16:28








 Laboratory Results





 06/11/17 04:00 





 06/11/17 04:00 





 











 06/10/17 06/11/17 06/12/17





 05:59 05:59 05:59


 


Intake Total  700 700


 


Output Total   0


 


Balance  700 700








 











PT  13.6 SEC (12.0-15.0)   06/10/17  19:00    


 


INR  1.05  (0.83-1.16)   06/10/17  19:00    








awake alert nad


anicteric


op clear


rrr systolic murmur


cta b


soft nt nd


no cce


warm dry well perfused


oriented appropriate





- Time Spent With Patient


Time Spent with Patient: greater than 35 minutes


Time Spent with Patient: Greater than 35 minutes spent on this patients care, 

greater than 50% of time spent counseling, educating, and coordinating care 

regarding the above mentioned plan.





ICD10 Worksheet


Patient Problems: 


 Problems











Problem Status Onset


 


Cellulitis and abscess of foot Acute  


 


Chest pain Acute

## 2017-06-12 VITALS
OXYGEN SATURATION: 95 % | HEART RATE: 58 BPM | TEMPERATURE: 98.4 F | SYSTOLIC BLOOD PRESSURE: 119 MMHG | RESPIRATION RATE: 6 BRPM | DIASTOLIC BLOOD PRESSURE: 48 MMHG

## 2017-06-12 LAB
% IMMATURE GRANULYOCYTES: 0.3 % (ref 0–1.1)
ABSOLUTE IMMATURE GRANULOCYTES: 0.04 10^3/UL (ref 0–0.1)
ABSOLUTE NRBC COUNT: 0 10^3/UL (ref 0–0.01)
ADD DIFF?: NO
ADD MORPH?: NO
ADD SCAN?: NO
ALBUMIN SERPL-MCNC: 3.1 G/DL (ref 3.5–5)
ANION GAP SERPL CALC-SCNC: 8 MEQ/L (ref 8–16)
AST SERPL-CCNC: 107 IU/L (ref 17–59)
ATYPICAL LYMPHOCYTE FLAG: 10 (ref 0–99)
BILIRUB SERPL-MCNC: 0.5 MG/DL (ref 0.1–1.4)
CALCIUM SERPL-MCNC: 9.7 MG/DL (ref 8.5–10.4)
CHLORIDE SERPL-SCNC: 103 MEQ/L (ref 97–110)
CO2 SERPL-SCNC: 26 MEQ/L (ref 22–31)
CREAT SERPL-MCNC: 1.3 MG/DL (ref 0.7–1.3)
ERYTHROCYTE [DISTWIDTH] IN BLOOD BY AUTOMATED COUNT: 13.6 % (ref 11.5–15.2)
EST. AVERAGE GLUCOSE BLD GHB EST-MCNC: 131 MG/DL (ref 68–126)
FRAGMENT RBC FLAG: 0 (ref 0–99)
GFR SERPL CREATININE-BSD FRML MDRD: 52 ML/MIN/{1.73_M2}
GLUCOSE SERPL-MCNC: 75 MG/DL (ref 70–100)
HBA1C MFR BLD: 6.2 % (ref 4–6)
HCT VFR BLD CALC: 41.8 % (ref 40–51)
HGB BLD-MCNC: 13.9 G/DL (ref 13.7–17.5)
LDH SERPL-CCNC: 1014 IU/L (ref 313–618)
LEFT SHIFT FLG: 0 (ref 0–99)
LIPEMIA HEMOLYSIS FLAG: 80 (ref 0–99)
MAGNESIUM SERPL-MCNC: 2.1 MG/DL (ref 1.6–2.3)
MCH RBC BLDCO QN: 30.6 PG (ref 27.9–34.1)
MCHC RBC AUTO-ENTMCNC: 33.3 G/DL (ref 32.4–36.7)
MCV RBC AUTO: 92.1 FL (ref 81.5–99.8)
NRBC-AUTO%: 0 % (ref 0–0.2)
PLATELET # BLD: 237 10^3/UL (ref 150–400)
PLATELET CLUMPS FLAG: 10 (ref 0–99)
PMV BLD AUTO: 9.9 FL (ref 8.7–11.7)
POTASSIUM SERPL-SCNC: 4.4 MEQ/L (ref 3.5–5.2)
RBC # BLD AUTO: 4.54 10^6/UL (ref 4.4–6.38)
SODIUM SERPL-SCNC: 137 MEQ/L (ref 134–144)

## 2017-06-12 RX ADMIN — MOMETASONE FUROATE SCH PUFFS: 220 INHALANT RESPIRATORY (INHALATION) at 08:17

## 2017-06-12 RX ADMIN — CARVEDILOL SCH MG: 6.25 TABLET, FILM COATED ORAL at 09:09

## 2017-06-12 NOTE — PDDCSUM
Discharge Summary


Discharge Summary: 





Dates of service 6/10-6/12/17





Discharge diagnosis:


# NSTEMI


# CAD


# a fib/flutter


# chronic foot wound


# htn


# hld


# bph





Consultations: cardiology


Procedures performed: cardiac angio with PCI to graft, echo





Hospital course by problem:


# NSTEMI: s/p PCI with BMS placement. He is doing well  now post cath and has 

no current pain, HD stable, groin site clean and w/o significant pain or 

swelling. Monitoring overnight on tele. will complete 30 days of dual 

antiplatelet therapy after dc. 


# CAD: as above, aggressive secondary prevention also needed, lipid panel 

checked and found to have LDL of 40. Continue asa, coreg, atorvastatin and will 

add plavix for the next 2-4 weeks. 


# a fib/flutter: most recent ecg personally reviewed and appears c/w flutter 

with underlying LBBB, rate controlled on BB/digoxin. He has not been on AC 

previously but has CHADS-vasc of 4 and this should be considered as an OP. Will 

defer to cardiology and they plan to see him in f/u to discuss this.


# chronic foot wound: appreciate wound care eval, will continue f/u with 

podiatry/wound clinic and current dressing changes


# htn: bp well controlled on lisinopril/coreg


# HLD: continue statin/zetia


# BPH: continue tamsulosin





Dispo: dc home


F/u with PCP, cardiology 


> 35 min spent in dc more than half in coordination of care

## 2017-06-12 NOTE — CPEKG
Heart Rate: 59

RR Interval: 1017

P-R Interval: 247

QRSD Interval: 132

QT Interval: 436

QTC Interval: 432

P Axis: 0

QRS Axis: -61

T Wave Axis: 134

EKG Severity - ABNORMAL ECG -

EKG Impression: SINUS RHYTHM

EKG Impression: VENTRICULAR PREMATURE COMPLEX

EKG Impression: Compared to EKG 6/11/2017 sinus rhythm has replaced atrial fibrillation.

EKG Impression: FIRST DEGREE AV BLOCK

EKG Impression: NONSPECIFIC IVCD WITH LAD

EKG Impression: LVH WITH SECONDARY REPOLARIZATION ABNORMALITY

Electronically Signed By: Tejas Jarvis 12-Jun-2017 10:39:10

## 2017-06-12 NOTE — PDIAF
- Diagnosis


Code Status: Full Code





- Medication Management


Discharge Medications: 


 Medications to Continue on Transfer





Digoxin [Lanoxin 0.125 mg] 250 mcg PO MOTUWETHFRSA@1800 07/29/11 [Last Taken 06/ 09/17]


Nitroglycerin [Nitrostat 0.4 mg (*)] 0.4 mg SL PRN PRN 07/29/11 [Last Taken 06/

10/17]


Omeprazole [Prilosec 20 mg] 20 mg PO DAILY@1800 07/29/11 [Last Taken 06/09/17]


Tamsulosin HCl [Flomax 0.4 MG (*)] 0.4 mg PO DAILY@1800 07/29/11 [Last Taken 06/ 09/17]


Aspirin [Aspirin 325 mg (*)] 325 mg PO DAILY@1800 07/02/14 [Last Taken 06/09/17]


Mometasone 220Mcg Inhaler [Asmanex Inh (*)] 2 puffs IH BID 07/02/14 [Last Taken 

06/10/17]


Multivitamins [Multivitamin (*)] 1 each PO DAILY@1800 07/02/14 [Last Taken 06/09 /17]


Calcium Carb W/Vit D [Calcium Carb W/Vit D 500/200 (*)] 1,000 mg PO DAILY@18 06/

10/17 [Last Taken 06/09/17]


Carvedilol [Coreg (*)] 6.25 mg PO DAILY 06/10/17 [Last Taken 06/10/17]


Carvedilol [Coreg (*)] 12.5 mg PO DAILY@18 06/10/17 [Last Taken 06/09/17]


Ezetimibe/Simvastatin [Ezetimibe-Simvastatin 10-40 mg] 1 each PO DAILY@18 06/10/

17 [Last Taken 06/09/17]


Lisinopril [Zestril 5 mg (*)] 5 mg PO MWF@0900 06/10/17 [Last Taken 06/09/17]


Atorvastatin Calcium [Lipitor 20 mg (*)] 20 mg PO DAILY@1800 #30 tab 06/12/17 [

Last Taken Unknown]


Clopidogrel Bisulfate [Plavix (*)] 75 mg PO DAILY #30 tab 06/12/17 [Last Taken 

Unknown]








Discharge Medications: Refer to the Discharge Home Medication list for PRN 

reason.





- Orders


Services needed: Home Care, Registered Nurse, Physical Therapy


Home Care Face to Face: I certify that this patient was under my care and that 

I had the required face-to-face encounter meeting the encounter requirements on 

the discharge day.  My findings support the fact that the patient is homebound 

as defined in CMS Chapter 7 Medicare Benefits Manual 30.1.1, The condition of 

the patient is such that there exists a normal inability to leave home and 

consequently, leaving home would require a considerable and taxing effort.


Diet Recommendation: cardiac -low fat low salt





- Follow Up Care


Current Providers and Referrals: 


Wellington Adamson MD [Primary Care Provider] - As per Instructions


Tejas Jarvis MD [Medical Doctor] -  (You have a follow-up of office 

appointment with Dr. Lewis on Tuesday June 20th at 2:15 pm  at  the [facility])

## 2017-06-12 NOTE — PDCARPN
Cardiology Progress Note


Chief Complaint: 





Patient reports he wants to go home.


Assessment/Plan: 


Assessment:


1st day that I have seen this patient.  86-year-old male with history of CABG 

mitral valve repair 10 years ago done by Dr. Dickey, paroxysmal atrial 

fibrillation, chronic sciatic pan (lumbar injection on 06/09 that was aborted 

due to placing needle in subarachnoid space), chronic wound to right foot.  

Admitted on 6/10/2017 for chest pressure pain.  Electrocardiogram showing left 

bundle branch block, elevated troponin levels on 06/10, but no chest pain.  Due 

to recent spinal injection, cardiac catheterization was held until 6/11 

troponin levels raise to 25. Cardiac catheterization showed 100% occluded LAD, 

unobstructed RCA, 100% occluded RCA, vein graft to ramus intermedius subtotal 

stenosis in the body of the graft with thrombus seen distally.  Widely patent 

LIMA to LAD.  PCI was performed to SVG to ramus, with a 2.75 x 32 mm Rebel BMS 

comma no complications.  Echocardiogram done on 6/11/2017 showed LV is mildly 

dilated with EF of 40% septal motion consistent conduction abnormality.  LA was 

mildly dilated, mild MR, angioplasty ring noted in the mitral position with 

mild TR, severe aortic valve calcification, mild AS, mild AI.  





At this time, patient reports no further episodes of chest pain or pressure 

since PCI.  He has been started on dual anti-platelet therapy, report with no 

bleeding issues, no neuro deficits. Continuous cardiac monitoring shows that he 

is in sinus rhythm,  occasional premature ventricular contraction, no malignant 

arrhythmias or pauses noted.  Patient has been up in walking the unit without 

any difficulties.  Voices no complaints.  Today's 12 lead electrocardiogram 

shows sinus rhythm with first-degree AV block, nonspecific IVCD with left 

anterior fascicular block.





Plan:


1. NSTEMI:  No further episodes of chest pressure since PCI.  Due to recent 

spinal injection patient received a bare metal stent.  He has been started on 

dual anti-platelet therapy of aspirin and Plavix.  No bleeding issues.  Will 

need at least dual anti-platelet therapy for at least minimum of 14-30 days.  

Patient on carvedilol and lisinopril.





2. Ischemic cardiomyopathy:  EF 40%, patient appears euvolemic, no signs of 

heart failure.  Continue on carvedilol and lisinopril





3.  Paroxysmal atrial fibrillation:  Patient currently in sinus rhythm  Patient 

on carvedilol and digoxin.  Patient does have a [id number] score of 4, but 

with recent spinal injection, and starting on dual anti-platelet therapy, will 

hold off on starting him on full anticoagulation at this time. Will re-evaluate 

in outpatient setting.





4.  Hypertension:  Well controlled on current medication regime.  No changes.





5.  Hyperlipidemia:  On statin and Zetia. 





Patient being discharged today, we have discussed the importance of medication 

compliance he, especially after recent BMS implantation into SVG to ramus.  We 

have discussed post discharge instructions including activity restrictions, 

monitoring for signs of infection, and follow-up.  Patient has a an appointment 

scheduled with Dr. Lewis, his primary cardiologist, next Tuesday.





06/12/17 12:21





Subjective: 





  Patient denies of any chest pain, shortness of breath, palpitations, 

lightheadedness, near-syncope or syncopal events when he up in walking


Reviewed/Discussed With: hospitalist (Dr Barrientos), other (Dr Somers)


Objective: 





 Vital Signs (8 Hrs)











  Temp Pulse Resp BP Pulse Ox


 


 06/12/17 11:27  36.9 C  58 L  6 L  119/48 L  95


 


 06/12/17 09:28   57 L  20   90 L


 


 06/12/17 07:34  36.7 C  62  12  121/59 H  90 L








 Intake/Output (24 Hrs)











 06/11/17 06/12/17 06/13/17





 05:59 05:59 05:59


 


Intake Total  400 


 


Balance  400 


 


Intake:   


 


  Oral (ml)  400 











Result Diagrams: 


 06/12/17 03:49





 06/12/17 03:49





- Physical Exam


Constitutional: healthy appearing, no apparent distress


Ears, Nose, Mouth, Throat: moist mucous membranes


Cardiovascular: regular rate and rhythm, no rubs, systolic murmur ( 2/6 right 

upper chest.), jugular vein distention, No carotid bruit


Peripheral Pulses: 2+: carotid (R), carotid (L), dorsalis-pedis (R), dorsalis-

pedis (L)


Respiratory: clear to auscultate bilat, no crackles, no wheezes


Gastrointestinal: normoactive bowel sounds, no masses


Skin: warm, no edema, other (  Dressing to right foot comma clean dry and 

intact.)


Neurologic: AAOx3


Psychiatric: cooperative, interactive, following commands





ICD10 Worksheet


Patient Problems: 


 Problems











Problem Status Onset


 


Cellulitis and abscess of foot Acute  


 


Chest pain Acute

## 2017-08-11 ENCOUNTER — HOSPITAL ENCOUNTER (OUTPATIENT)
Dept: HOSPITAL 80 - FIMAGING | Age: 82
Discharge: HOME | End: 2017-08-11
Attending: RADIOLOGY
Payer: COMMERCIAL

## 2017-08-11 DIAGNOSIS — M54.16: Primary | ICD-10-CM

## 2018-03-31 ENCOUNTER — HOSPITAL ENCOUNTER (OUTPATIENT)
Dept: HOSPITAL 80 - FIMAGING | Age: 83
End: 2018-03-31
Attending: INTERNAL MEDICINE
Payer: COMMERCIAL

## 2018-03-31 DIAGNOSIS — M75.122: Primary | ICD-10-CM

## 2018-03-31 DIAGNOSIS — M19.012: ICD-10-CM

## 2018-04-20 ENCOUNTER — HOSPITAL ENCOUNTER (OUTPATIENT)
Dept: HOSPITAL 80 - FIMAGING | Age: 83
End: 2018-04-20
Attending: INTERNAL MEDICINE
Payer: COMMERCIAL

## 2018-04-20 DIAGNOSIS — R63.4: ICD-10-CM

## 2018-04-20 DIAGNOSIS — J90: ICD-10-CM

## 2018-04-20 DIAGNOSIS — J40: ICD-10-CM

## 2018-04-20 DIAGNOSIS — I70.0: ICD-10-CM

## 2018-04-20 DIAGNOSIS — R91.1: ICD-10-CM

## 2018-04-20 DIAGNOSIS — M67.912: Primary | ICD-10-CM

## 2018-04-20 DIAGNOSIS — J21.9: ICD-10-CM

## 2018-04-20 DIAGNOSIS — K57.30: ICD-10-CM

## 2018-04-20 DIAGNOSIS — Z95.1: ICD-10-CM

## 2018-04-20 DIAGNOSIS — I25.10: ICD-10-CM

## 2018-04-20 DIAGNOSIS — M48.56XA: ICD-10-CM

## 2018-04-20 PROCEDURE — 74177 CT ABD & PELVIS W/CONTRAST: CPT

## 2018-04-20 PROCEDURE — 71260 CT THORAX DX C+: CPT

## 2018-05-08 ENCOUNTER — HOSPITAL ENCOUNTER (OUTPATIENT)
Dept: HOSPITAL 80 - FED | Age: 83
Setting detail: OBSERVATION
LOS: 1 days | Discharge: HOME | End: 2018-05-09
Attending: SURGERY | Admitting: SURGERY
Payer: COMMERCIAL
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
## 2018-05-08 DIAGNOSIS — S22.42XA: Primary | ICD-10-CM

## 2018-05-08 DIAGNOSIS — Z79.01: ICD-10-CM

## 2018-05-08 LAB
INR PPP: 1.06 (ref 0.83–1.16)
PLATELET # BLD: 334 10^3/UL (ref 150–400)
PROTHROMBIN TIME: 14 SEC (ref 12–15)

## 2018-05-08 PROCEDURE — 97161 PT EVAL LOW COMPLEX 20 MIN: CPT

## 2018-05-08 PROCEDURE — 97165 OT EVAL LOW COMPLEX 30 MIN: CPT

## 2018-05-08 PROCEDURE — 97116 GAIT TRAINING THERAPY: CPT

## 2018-05-08 PROCEDURE — G0378 HOSPITAL OBSERVATION PER HR: HCPCS

## 2018-05-08 PROCEDURE — 92523 SPEECH SOUND LANG COMPREHEN: CPT

## 2018-05-08 PROCEDURE — 70450 CT HEAD/BRAIN W/O DYE: CPT

## 2018-05-08 PROCEDURE — 93005 ELECTROCARDIOGRAM TRACING: CPT

## 2018-05-08 PROCEDURE — 71260 CT THORAX DX C+: CPT

## 2018-05-08 PROCEDURE — 99285 EMERGENCY DEPT VISIT HI MDM: CPT

## 2018-05-08 PROCEDURE — 73060 X-RAY EXAM OF HUMERUS: CPT

## 2018-05-08 PROCEDURE — 71046 X-RAY EXAM CHEST 2 VIEWS: CPT

## 2018-05-08 PROCEDURE — 71045 X-RAY EXAM CHEST 1 VIEW: CPT

## 2018-05-08 RX ADMIN — FLUTICASONE PROPIONATE SCH PUFFS: 220 AEROSOL, METERED RESPIRATORY (INHALATION) at 21:57

## 2018-05-08 RX ADMIN — ACETAMINOPHEN PRN MG: 325 TABLET ORAL at 18:37

## 2018-05-08 RX ADMIN — IBUPROFEN SCH MG: 600 TABLET ORAL at 21:08

## 2018-05-08 NOTE — GHP
[f rep st]



                                                            HISTORY AND PHYSICAL





DATE OF ADMISSION:  05/08/2018



CHIEF COMPLAINT:  Fall.



HISTORY OF PRESENT ILLNESS:  The patient is an 87-year-old man who fell while carrying a garden hose 
upstairs.  He normally uses a cane because of sciatica and when he was going up the stairs, he was un
able to use the cane and lost his balance.  He presented to the emergency room, and a CT scan was obt
ained, which did not show any intracranial injury.  The chest showed left-sided rib fractures 4-8.  H
e is on Plavix for a heart stent.



PAST MEDICAL HISTORY:  Cardiac stent, aortic valve, sciatica, hypertension, atrial fibrillation.



PAST SURGICAL HISTORY:  Aortic valve, CABG.



SOCIAL HISTORY:  Does not use tobacco or alcohol use.  He lives with his wife.



FAMILY HISTORY:  Noncontributory.



REVIEW OF SYSTEMS:  Ten-point review of systems is significant for pain with deep inspiration and gai
t difficulty.



PHYSICAL EXAM:  VITAL SIGNS:  Reviewed.  GENERAL:  Pleasant, well-nourished, well-groomed man, sittin
g up in [facility].  HEENT:  Normocephalic.  He has a small abrasion over his left forehead.  There is no
 obvious hematoma.  Pupils equal and round.  He is slightly hard hearing.  No otorrhea.  No rhinorrhe
a.  Teeth fit together normally.  No midface instability.  LUNGS:  Clear to auscultation bilaterally.
  No increased work of breathing.  CHEST:  No obvious signs of ecchymosis or abrasions.  CARDIAC:  Re
gular rate.  ABDOMEN:  Bowel sounds present.  Soft, nontender.  MUSCULOSKELETAL:  Arthritic joints.  
PSYCH:  Mood and affect normal.  NEURO:  Grossly intact.



RESULTS REVIEWED:  I personally reviewed the results of his chest x-ray, as well as his CT scans.  I 
see the rib fractures.  I do not see a hemo or pneumothorax.



IMPRESSION AND PLAN:  The patient is an 87-year-old man on Plavix with 3 rib fractures.  I will admit
 him to the hospital for observation.  I have encouraged him to do cough, deep breathing.  I apprecia
te the hospitalist assisting with his comorbidities.  We will get a chest x-ray in the morning.  I am
 hopeful he will be able to be discharged tomorrow.





Job #:  [id number]

## 2018-05-08 NOTE — CPEKG
Heart Rate: 62

RR Interval: 968

P-R Interval: 233

QRSD Interval: 134

QT Interval: 524

QTC Interval: 533

P Axis: 0

QRS Axis: -55

T Wave Axis: 148

EKG Severity - ABNORMAL ECG -

EKG Impression: SINUS RHYTHM

EKG Impression: ATRIAL PREMATURE COMPLEX

EKG Impression: FIRST DEGREE AV BLOCK

EKG Impression: LEFT BUNDLE BRANCH BLOCK

Electronically Signed By: Tejas Jarvis 09-May-2018 08:41:40

## 2018-05-08 NOTE — GCON
[f rep st]



                                                                    CONSULTATION





MEDICINE CONSULTATION



REASON FOR CONSULTATION:  This is a medicine consultation at the request of Dr. Caroline Brito for evalu
ation and management of multiple medical issues including coronary artery disease in the setting of m
ultiple rib fractures.



HISTORY OF PRESENT ILLNESS:  This is an 87-year-old man who has multiple medical issues including cor
onary artery disease, atrial fib and flutter, as well as chronic sciatic back pain, who presents stat
us post a mechanical fall with multiple rib fractures.  The patient notes that he was at home walking
, when he felt as if his legs gave out secondary to his chronic sciatic pain.  He fell and landed on 
some rocks on his left flank.  Since then, he has noted significant pain especially with coughing but
 also with deep breathing.  His other main concern at this point is that his sciatic pain is so much 
worse and that his prior doctor who cared for this, Dr. Anderson, is no longer able to care for him.  H
fyae notes that at home, he walks typically with a cane, though he also has a walker at home that he rar
ely uses because he feels as if it is too big for him.  He notes that he does not have a history of f
requent falling, however.



PAST MEDICAL HISTORY:  

1.  Coronary artery disease with recent NSTEMI and PCI slightly less than 1 year ago.

2.  Valvular heart disease.

3.  Atrial fibrillation/flutter.

4.  Chronic radicular back pain/sciatica.

5.  Hypertension.

6.  Hyperlipidemia.

7.  BPH.



PAST SURGICAL HISTORY:  Includes: 

1.  2-vessel CABG.

2.  Stents.

3.  Mitral valve repair.

4.  Hernia repair.



FAMILY HISTORY:  Reviewed and noncontributory.



SOCIAL HISTORY:  Patient is  and lives independently with his wife.  He is quite active at [employer].  He is a non smoker, nondrug user, states he almost never drinks.



REVIEW OF SYSTEMS:  10-point review of systems obtained, negative except as per HPI.



HOME MEDICATIONS:  Include: 

1.  Tamsulosin.

2.  Senna.

3.  Omeprazole.

4.  Lisinopril.

5.  Fluticasone.

6.  Digoxin.

7.  Clopidogrel.

8.  Carvedilol.

9.  Calcium.

10.  Atorvastatin.

11.  Aspirin.



ALLERGIES:  Codeine.



PHYSICAL EXAMINATION:  VITAL SIGNS:  /66, heart rate 71, respiratory rate 19, O2 sats 96% on ro
om air, temperature 36.8.  GENERAL APPEARANCE:  This is well-developed, well-nourished elderly man.  
He is awake and alert.  He is in no acute distress.  HEENT:  Eyes anicteric.  Oropharynx clear.  CARD
IOVASCULAR:  RRR, no MRG.  PULMONARY:  CTA bilaterally with somewhat diminished breath sounds at the 
bases.  CHEST:  Tender to palpation over the left flank.  ABDOMEN:  Soft, nontender.  Positive bowel 
sounds.  EXTREMITIES:  No clubbing, cyanosis, or edema.  SKIN:  Warm, dry, well perfused.  NEURO/PSYC
H:  Oriented and appropriate, pleasant.



CLINICAL DATA:  Labs reviewed and notable for white blood cell count of 14, hematocrit of 40.  Coags 
are unremarkable.  Chemistry notable only for a BUN of 25, creatinine 1.2. 



Chest x-ray personally reviewed and interpreted, showing left basilar opacities that have been presen
t previously as well. 



Chest CT shows acute non or minimally displaced lateral left 4th through 6th rib fractures as well as
 a shifting multifocal consolidation, atelectasis versus less likely pneumonia.  Head CT showing no a
cute intracranial findings.



ASSESSMENT/PLAN:  This is an 87-year-old man status post mechanical fall, presenting with multiple ri
b fractures.

1.  Multiple rib fractures.  Patient has been admitted to the Trauma service.  He has been started on
 the rib protocol.  Discussed with him the importance of deep breathing and good effective pain manag
ement.  Patient does express understanding.  Currently appears comfortable.  Physical Therapy/Occupat
ional Therapy will be involved.

2.  Mechanical fall.  This does sound as if it was related to his sciatic pain as per number next.  H
e does walk with a cane at home, though he has previously been recommended to use a walker.  Again, P
hysical Therapy/Occupational Therapy will be involved, but suspect transitioning to a walker might be
 in his best interest.

3.  Radicular back pain.  The patient has had issues with sciatica in the past and it sounds that on 
this occasion, this led to his fall.  He has previously benefitted from injections through [facility] Radiology, but has been unable to do this recently, given his usual physician has retired.  I di
scussed with him that should he be in-house long enough, this may be able to be performed as an inpat
ient.

4.  Coronary artery disease with recent non-ST segment elevation myocardial infarction.  He is mainta
ined on aspirin, statin, beta blocker and Plavix.  We will continue these medications for now, though
 we will need to consider holding for [facility] injection if this is something that co
uld possibly be arranged as an inpatient.  

5.  Paroxysmal atrial fibrillation.  The patient appears to be sinus rhythm on evaluation in the [facility] department.  He is on beta blocker as well as digoxin chronically.  We will obtain an electroca
rdiogram and continue his home medications.

6.  Lung consolidation noted on imaging.  This appears to be a longstanding issue.  This is being fol
lowed by his primary care physician.  In comparison with old x-rays, there does not appear to be sign
ificant change.  He is relatively asymptomatic.  We will not work up further in-house unless this isabel
nges.

7.  Disposition:  Observation per General Surgery.

8.   

Patient is new to my care.  Old records reviewed, summarized as per HPI and past medical history.  Ca
re plan reviewed with ER physician including plans for rib protocol. 



Thank you for this consultation.  Medicine will continue to follow while patient is in-house.





Job #:  [id number]

## 2018-05-09 VITALS — SYSTOLIC BLOOD PRESSURE: 126 MMHG | DIASTOLIC BLOOD PRESSURE: 52 MMHG

## 2018-05-09 LAB — PLATELET # BLD: 320 10^3/UL (ref 150–400)

## 2018-05-09 RX ADMIN — IBUPROFEN SCH MG: 600 TABLET ORAL at 05:52

## 2018-05-09 RX ADMIN — ACETAMINOPHEN PRN MG: 325 TABLET ORAL at 10:21

## 2018-05-09 RX ADMIN — FLUTICASONE PROPIONATE SCH PUFFS: 220 AEROSOL, METERED RESPIRATORY (INHALATION) at 10:23

## 2018-05-09 NOTE — TRAUMAPN
Trauma Progress Note


Assessment/Plan: 


88 y/o M with a history of heart stents and on anticoagulation s/p mechanical 

fall yesterday.  Injuries include left sided rib fractures 4-8.  No 

pneumothorax.


Head CT negative for intracranial bleeding





S: Sitting up in bed during visit.  C/o of soreness in his right ribs.  No SOB.

  Lives at home with his wife and feels he could be discharged today or 

tomorrow.  Denies headaches.








O: Alert


Afebrile


RRR


Chest: appropriately tender to palpation on left side.  Breath sounds CTA 

bilaterally


Repeat chest xray this am stable.


Abdomen: soft, nontender, +BS


Neuro: CN 2-12 grossly intact.


Skin: superficial abrasion to left scalp.  No surrounding signs of infection.





Plan:  Chest CT showed cortical lucencies in humeri, possibly artifact.  Follow 

up 2-way xray to reassess.  Likely home today or tomorrow.  Dispo home.


Objective: 





 Vital Signs











Temp Pulse Resp BP Pulse Ox


 


 35.9 C L  85   17   126/52 H  95 


 


 05/09/18 00:00  05/09/18 10:22  05/09/18 00:00  05/09/18 00:00  05/09/18 00:00








 Laboratory Results





 05/09/18 06:40 





 











 05/08/18 05/09/18 05/10/18





 05:59 05:59 05:59


 


Intake Total  1300 


 


Balance  1300 








 











PT  14.0 SEC (12.0-15.0)   05/08/18  14:05    


 


INR  1.06  (0.83-1.16)   05/08/18  14:05

## 2018-05-09 NOTE — TRAUMAPN
Trauma Progress Note


Assessment/Plan: 


tertiary exam:  87 male with fall in his garden with left rib fxs and scalp 

abrasion


alert, comfortable


neuro intact


chest symmetric bs


cor rr


abd soft and nontender


extre full rom and pulses








cxr stable





plan home today / fu next week in office


Objective: 





 Vital Signs











Temp Pulse Resp BP Pulse Ox


 


 35.9 C L  85   17   126/52 H  95 


 


 05/09/18 00:00  05/09/18 10:22  05/09/18 00:00  05/09/18 00:00  05/09/18 00:00








 Laboratory Results





 05/09/18 06:40 





 











 05/08/18 05/09/18 05/10/18





 05:59 05:59 05:59


 


Intake Total  1300 


 


Balance  1300 








 











PT  14.0 SEC (12.0-15.0)   05/08/18  14:05    


 


INR  1.06  (0.83-1.16)   05/08/18  14:05

## 2018-05-09 NOTE — ASMTLACE
LACE

 

Length of stay for            Answers:  Less than 1 day                       

current admission                                                             

Acuity / Level of             Answers:  No                                    

Care: Did the patient                                                         

have an inpatient                                                             

admission?                                                                    

Comorbidities - select        Answers:  Coronary Artery Disease               

all that apply                                                                

                                        Other                         Notes:  Aortic valve; CABG

# of Emergency department     Answers:  1-2                                   

visits in the last 6                                                          

months                                                                        

Score: 4

 

Date Signed:  05/09/2018 10:42 AM

Electronically Signed By:Gin Nails RN

## 2018-05-10 NOTE — ASMTCMCOM
CM Note

 

CM Note                       

Notes:

[facility] notified us late in the day, they cannot accept patient due to not taking his 

insurance. Patient had already been d/c'ed. Contacted patient to inform him and ask him if he 

wanted me to locate another home health agency. Patient states he is in too much pain and plans to 

go for a series of shots to alleviate this and doesn't feel he is interested in home health at this 


time. No further needs.

 

Date Signed:  05/10/2018 01:30 PM

Electronically Signed By:Lata Gage LCSW

## 2018-05-18 ENCOUNTER — HOSPITAL ENCOUNTER (OUTPATIENT)
Dept: HOSPITAL 80 - FIMAGING | Age: 83
Discharge: HOME | End: 2018-05-18
Attending: INTERNAL MEDICINE
Payer: COMMERCIAL

## 2018-05-18 DIAGNOSIS — M54.30: Primary | ICD-10-CM

## 2018-05-18 PROCEDURE — 3E0S33Z INTRODUCTION OF ANTI-INFLAMMATORY INTO EPIDURAL SPACE, PERCUTANEOUS APPROACH: ICD-10-PCS | Performed by: RADIOLOGY

## 2018-05-24 ENCOUNTER — HOSPITAL ENCOUNTER (OUTPATIENT)
Dept: HOSPITAL 80 - CIMAGING | Age: 83
End: 2018-05-24
Attending: INTERNAL MEDICINE
Payer: COMMERCIAL

## 2018-05-24 DIAGNOSIS — J92.9: ICD-10-CM

## 2018-05-24 DIAGNOSIS — J90: Primary | ICD-10-CM

## 2018-05-27 NOTE — EDPHY
D Team Surgery Serial Abdominal Exam Note (p [id number])    SUBJECTIVE:  The patient was seen and examined this evening. Pain is controlled. Was out of bed to chair today. Denies nausea and vomiting. Drain output 174 over 12 hours.    OBJECTIVE:     ** PHYSICAL EXAM **    General: awake, alert  Pulm: respirations unlabored  Abdomen: incision c/d/i, soft, mildly distended, TTP in RLQ, LLQ IR drain in place with green appearing output  Extremities: Grossly symmetric    ** VITAL SIGNS / I&O's **    Vital Signs Last 24 Hrs  T(C): 36.9 (27 May 2018 20:10), Max: 37.7 (27 May 2018 00:26)  T(F): 98.4 (27 May 2018 20:10), Max: 99.9 (27 May 2018 00:26)  HR: 91 (27 May 2018 20:10) (86 - 95)  BP: 105/40 (27 May 2018 20:10) (105/40 - 116/66)  BP(mean): 79 (27 May 2018 11:30) (79 - 79)  RR: 18 (27 May 2018 20:10) (18 - 18)  SpO2: 95% (27 May 2018 20:10) (94% - 98%)      26 May 2018 07:01  -  27 May 2018 07:00  --------------------------------------------------------  IN:    Enteral Tube Flush: 20 mL  Total IN: 20 mL    OUT:    Bulb: 465 mL    Voided: 5050 mL  Total OUT: 5515 mL    Total NET: -5495 mL      27 May 2018 07:01  -  27 May 2018 21:00  --------------------------------------------------------  IN:  Total IN: 0 mL    OUT:    Bulb: 174 mL    Voided: 2000 mL  Total OUT: 2174 mL    Total NET: -2174 mL      ASSESSMENT/PLAN:  71 F s/p cytoreductive surgery, HIPEC (5/8),  s/p IR drainage of fluid (5/21), CT 5/22 showing extraluminal oral contrast, suggestive of a small bowel leak, IR drain in place    -plan for CT scan 5/28  -Pain control as needed  -NPO/TPN  -continue Flush/aspirate IR drain w/ 10cc saline TID  -f/u cultures  -VTE prophylaxis  -Continue antibiotics  -c/w octreotide drip    [id number] H & P


Stated Complaint: mechanical fall--L chest onto rocks, hit head-on coumadin, 

pleuritic cp


Time Seen by Provider: 05/08/18 13:50


HPI/ROS: 





HPI





CHIEF COMPLAINT:  Mechanical trip and fall, left lateral rib pain.  Head strike.





HISTORY OF PRESENT ILLNESS:  This is 87-year-old male, history of aortic valve 

replacement on Plavix, he presents emergency room after he had a mechanical 

trip and fall.  Patient reports to me suffers from significant left sided 

sciatica his left leg.  He was walking outside carrying a garden hose.  The 

left leg went out and he fell onto his left side.  Complains of left lateral 

rib pain.  Additionally complains of a mild headache.  He had head strike.  No 

LOC.  He presents emergency room GCS 15, alert or x4.  Private vehicle.





Past Medical History:  Aortic valve on Plavix significant ,sciatica





Past Surgical History:  Aortic valve, CABG, hypertension, AFib





Social History:  Denies drugs alcohol tobacco.





Family History:  Noncontributory








ROS   


REVIEW OF SYSTEMS:


A comprehensive 10 point review of systems is otherwise negative aside from 

elements mentioned in the history of present illness.








Exam   


Constitutional appears well nontoxic no acute distress,  triage nursing summary 

reviewed, vital signs reviewed, awake/alert. 


Eyes   normal conjunctivae and sclera, EOMI, PERRLA. 


HENT  head/neck abrasion over the left forehead, no significant midline 

cervical spine pain, no step-offs, moist mucus membranes, no epistaxis, neck 

supple/ no meningismus, no raccoon eyes. 


Respiratory   clear to auscultation bilaterally, normal breath sounds, no 

respiratory distress, no wheezing. 


Cardiovascular chest wall good breath sounds bilaterally.  Left lateral chest 

under the axilla has mild tenderness palpation,  rate normal, regular rhythm, 

no murmur, no edema, distal pulses normal. 


Gastrointestinal   soft, non-tender, no rebound, no guarding, normal bowel 

sounds, no distension, no pulsatile mass. 


Genitourinary   no CVA tenderness. 


Musculoskeletal  no midline vertebral tenderness, full range of motion, no calf 

swelling, no tenderness of extremities, no meningismus, good pulses, 

neurovascularly intact.


Skin   pink, warm, & dry, no rash, skin atraumatic. 


Neurologic   awake, alert and oriented x 3, AAOx3, moves all 4 extremities 

equally, motor intact, sensory intact, CN II-XII intact, normal cerebellar, 

normal vision, normal speech. 


Psychiatric   normal mood/affect. 


Heme/Lymph/Immune   no lymphadenopathy.





Differential Diagnosis:  Includes but is not limited to in a particular order 

rib fractures, pneumothorax, hemothorax, tension pneumothorax, closed head 

injury, intracranial bleed, skull fracture





Medical Decision Making:  Plan for this patient basic blood work, CT scan head 

without contrast for trauma, chest x-ray two view to rule out trauma.





Re-evaluation:








CT scan head without contrast fall on Plavix.  This is negative for acute 

bleed.  Called to me by Dr. Jamir Steve





Patient's chest x-ray two view reviewed by myself.  I am unable to visualize 

pneumothorax or rib fractures.  Given his age in left lateral rib pain will 

proceed with CT chest with IV contrast for trauma.





1607:  CT scan of the chest with IV contrast called to me by Dr. Korin Steve.  This shows multiple left-sided rib fractures.  No evidence of 

pneumothorax.





Given this patient's age, I will consult Trauma surgery for possible 

observation overnight.








1615:  Spoke with the trauma surgeon Dr. Brito, agrees to admit this patient 

for multiple rib fractures.





Will additionally consult the hospitalist service for co management.





Updated patient.





CT scan of the head does not show acute traumatic injury





CT scan of the chest shows multiple left-sided rib fractures.  For through 5th 

rib fracture.





Plan for admission to the trauma surgeon for for multiple left-sided rib 

fractures.  With hospital consult.


Source: Patient





- Personal History


Current Tetanus/Diphtheria Vaccine: No


Current Tetanus Diphtheria and Acellular Pertussis (TDAP): No


Tetanus Vaccine Date: 2005





- Medical/Surgical History


Hx Asthma: Yes


Hx Chronic Respiratory Disease: No


Hx Diabetes: No


Hx Cardiac Disease: Yes


Hx Renal Disease: No


Hx Cirrhosis: No


Hx Alcoholism: No


Hx HIV/AIDS: No


Hx Splenectomy or Spleen Trauma: No


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





- Social History


Smoking Status: Never smoked


Constitutional: 


 Initial Vital Signs











Temperature (C)  37.0 C   05/08/18 13:38


 


Heart Rate  108 H  05/08/18 13:38


 


Respiratory Rate  18   05/08/18 13:38


 


Blood Pressure  124/76 H  05/08/18 13:38


 


O2 Sat (%)  95   05/08/18 13:38








 











O2 Delivery Mode               Room Air














Allergies/Adverse Reactions: 


 





codeine Allergy (Intermediate, Verified 08/10/17 16:03)


 








Home Medications: 














 Medication  Instructions  Recorded


 


Aspirin [Aspirin 325 mg (*)] 325 mg PO DAILY@17 05/08/18


 


Atorvastatin Calcium [Lipitor 40 40 mg PO DAILY@17 05/08/18





mg (*)]  


 


Calcium/D3/Mag Ox//Abel/Zn 1 each PO DAILY 05/08/18





[Caltrate+D3 Plus Mineral Minis]  


 


Carvedilol 6.25 mg PO DAILY 05/08/18


 


Carvedilol 12.5 mg PO DAILY@17 05/08/18


 


Clopidogrel Bisulfate [Clopidogrel] 75 mg PO DAILY 05/08/18


 


Digoxin [Lanoxin 250 mcg (RX)] 250 mcg PO DAILY@17 05/08/18


 


Fluticasone Hfa 220 Mcg [Flovent 1 puffs IH BID 05/08/18





220 MCG Hfa MDI (*)]  


 


Lisinopril [Zestril 5 mg (*)] 5 mg PO MOWEFR 05/08/18


 


Omeprazole 20 mg PO DAILY@17 05/08/18


 


Sennosides/Docusate Sodium [Stool 1 each PO DAILY PRN 05/08/18





Softener Tablet]  


 


Tamsulosin HCl [Flomax 0.4 MG (*)] 0.4 mg PO DAILY@17 05/08/18


 


levOFLOXACIN [levAQUIN (*)] 750 mg PO ONCE 05/08/18


 


Acetaminophen [Tylenol 325mg (*)] 325 - 650 mg PO Q4HRS PRN  tab 05/09/18


 


traMADol [Ultram 50 mg (*)] 25 - 50 mg PO Q6HRS PRN #30 tab 05/09/18














Medical Decision Making





- Data Points


Laboratory Results: 


 Laboratory Results





 05/08/18 14:05 





 05/08/18 14:05 








Medications Given: 


 








Discontinued Medications





Acetaminophen (Tylenol)  325 - 650 mg PO Q4HRS PRN


   PRN Reason: Pain, Mild Able to Take PO


   Stop: 11/04/18 16:31


   Last Admin: 05/09/18 10:21 Dose:  650 mg


Carvedilol (Coreg)  6.25 mg PO DAILY [facility] hospital


   Stop: 11/05/18 08:59


   Last Admin: 05/09/18 10:22 Dose:  6.25 mg


Clopidogrel Bisulfate (Plavix)  75 mg PO DAILY MECHE


   Stop: 11/05/18 08:59


   Last Admin: 05/09/18 10:22 Dose:  75 mg


Fentanyl (Sublimaze)  50 mcg IVP EDNOW ONE


   Stop: 05/08/18 16:26


   Last Admin: 05/08/18 16:37 Dose:  50 mcg


Fluticasone Propionate (Flovent Hfa)  1 puffs IH BID MECHE


   Stop: 11/04/18 20:59


   Last Admin: 05/09/18 10:23 Dose:  1 puffs


Sodium Chloride (Ns)  1,000 mls @ 0 mls/hr IV ONCE ONE


   PRN Reason: Wide Open


   Stop: 05/08/18 16:26


   Last Admin: 05/08/18 16:39 Dose:  1,000 mls


Ibuprofen (Motrin)  600 mg PO Q8HRS Critical access hospital


   Stop: 11/04/18 21:59


   Last Admin: 05/09/18 05:52 Dose:  600 mg


Senna/Docusate Sodium (Senokot-S)  1 tab PO DAILY PRN


   PRN Reason: Constipation


   Stop: 11/04/18 19:15


   Last Admin: 05/08/18 21:08 Dose:  1 tab








Departure





- Departure


Disposition: [facility] Inpatient Acute


Clinical Impression: 


Fall


Qualifiers:


 Encounter type: initial encounter Qualified Code(s): W19.XXXA - Unspecified 

fall, initial encounter





Rib contusion


Qualifiers:


 Encounter type: initial encounter Laterality: left Qualified Code(s): S20.212A 

- Contusion of left front wall of thorax, initial encounter





Rib fractures


Qualifiers:


 Encounter type: initial encounter Rib fracture type: multiple ribs Fracture 

type: closed Laterality: left Qualified Code(s): S22.42XA - Multiple fractures 

of ribs, left side, initial encounter for closed fracture





Condition: Fair

## 2018-06-21 ENCOUNTER — HOSPITAL ENCOUNTER (OUTPATIENT)
Dept: HOSPITAL 80 - FIMAGING | Age: 83
End: 2018-06-21
Payer: COMMERCIAL

## 2018-06-21 DIAGNOSIS — I74.3: Primary | ICD-10-CM

## 2018-06-21 DIAGNOSIS — I72.4: ICD-10-CM

## 2018-06-25 ENCOUNTER — HOSPITAL ENCOUNTER (OUTPATIENT)
Dept: HOSPITAL 80 - FIMAGING | Age: 83
End: 2018-06-25
Attending: INTERNAL MEDICINE
Payer: COMMERCIAL

## 2018-06-25 DIAGNOSIS — J84.10: Primary | ICD-10-CM

## 2018-06-25 DIAGNOSIS — J98.09: ICD-10-CM

## 2018-06-25 DIAGNOSIS — J98.19: ICD-10-CM

## 2018-06-25 PROCEDURE — 71260 CT THORAX DX C+: CPT

## 2018-07-26 ENCOUNTER — HOSPITAL ENCOUNTER (INPATIENT)
Dept: HOSPITAL 80 - FED | Age: 83
LOS: 4 days | Discharge: HOME HEALTH | DRG: 853 | End: 2018-07-30
Attending: HOSPITALIST | Admitting: INTERNAL MEDICINE
Payer: COMMERCIAL

## 2018-07-26 DIAGNOSIS — R13.10: ICD-10-CM

## 2018-07-26 DIAGNOSIS — J69.0: ICD-10-CM

## 2018-07-26 DIAGNOSIS — E87.2: ICD-10-CM

## 2018-07-26 DIAGNOSIS — A41.9: Primary | ICD-10-CM

## 2018-07-26 DIAGNOSIS — I48.0: ICD-10-CM

## 2018-07-26 DIAGNOSIS — I25.10: ICD-10-CM

## 2018-07-26 DIAGNOSIS — J98.19: ICD-10-CM

## 2018-07-26 LAB
INR PPP: 1.18 (ref 0.83–1.16)
PLATELET # BLD: 574 10^3/UL (ref 150–400)
PROTHROMBIN TIME: 15.2 SEC (ref 12–15)

## 2018-07-26 PROCEDURE — G0378 HOSPITAL OBSERVATION PER HR: HCPCS

## 2018-07-26 RX ADMIN — MENTHOL SCH: 1 SPRAY TOPICAL at 21:05

## 2018-07-26 RX ADMIN — GUAIFENESIN SCH MG: 600 TABLET, EXTENDED RELEASE ORAL at 21:26

## 2018-07-26 RX ADMIN — IPRATROPIUM BROMIDE AND ALBUTEROL SULFATE SCH ML: .5; 3 SOLUTION RESPIRATORY (INHALATION) at 22:07

## 2018-07-26 NOTE — EDPHY
HPI/HX/ROS/PE/MDM


Narrative: 





CHIEF COMPLAINT:  Fall, weight loss, chest congestion





HISTORY OF PRESENT ILLNESS:   The patient is an anticoagulated (Plavix) 89 y/o 

male with a history of aortic valve surgery, coronary artery bypass, and atrial 

fibrillation arriving via EMS after he fell. Over the past 3 months, he has 

been slowly declining with 40 pounds of weight loss, increasing chest congestion

, and increasing weakness.  He had a left lower lobe pneumonia and since that 

time has had CT scans demonstrating atelectasis, bronchial debris, and left 

lower lobe collapse. Today, he was sitting on the toilet, when his leg fell 

asleep, making him unable to stand up. His wife tried to help him but he fell 

off the toilet. He denies striking his head or losing consciousness. EMS was 

called. He reports he is coughing up white, green, and brown mucus. 





Patient's primary complaint to myself visit he feels weak, he has lost weight, 

he somewhat depressed, and he has a very wet cough with mucus congestion that 

he is unable to clear.





No fever, chills, chest pain, palpitations, vomiting, diarrhea, urinary 

complaints, headache, lightheadedness. 





REVIEW OF SYSTEMS:


Aside from elements discussed in the HPI, a comprehensive 10-point review of 

systems was reviewed and is negative.





PAST MEDICAL HISTORY:   Aortic valve surgery, coronary artery bypass, 

hypertension, atrial fibrillation, sciatica with inoperable cyst





SOCIAL HISTORY:   Wife and son-in-law at bedside, lives in [place], [facility]





VITAL SIGNS: Reviewed by me.  Temperature 38.1 degrees.


GENERAL: Resting comfortably, pale, thin, cachectic.  Frequent wet cough that 

is weak.  


HEENT: Atraumatic. Eyes: No icterus, no injection. Mouth: moist mucous 

membranes.  No erythema or lesions. Neck: supple with no adenopathy.


LUNGS:  Diminished/absent breath sounds at the left base.  Otherwise lung 

fields are clear with intermittent bronchial breath sounds.  


CARDIAC: Heart difficult to auscultate over the patient's frequent coughing and 

bronchial breath sounds. Systolic murmur noted. 


ABDOMEN: Soft, scaphoid, nontender, nondistended, bowel sounds normal.


BACK:  No CVA tenderness.


EXTREMITIES: No trauma. No edema.  Range of motion is normal throughout.


NEURO: Alert and oriented, motor strength 5/5 with testing although patient 

reports feeling generally quite weak.  No focal weakness.  No focal sensory 

loss.  Cranial nerves 2-12 are intact.  


SKIN: Warm and dry, no rash.


PSYCHIATRIC: Normal mentation, no agitation.





ED Course: 


X-ray: Chest x-ray was obtained.  I viewed the images myself on the PACS 

system.  My interpretation of the images is: increasing consolidation and 

volume loss of left lower lobe.  The radiologist interpretation is 

consolidation and volume loss of left lower lobe.  I discussed the x-ray 

findings with the patient.








The patient presents with increasing weakness, weight loss, and chest 

congestion for the past 3 months. Today he fell trying to get off the toilet. 

He denies striking his head, loss of consciousness, or any other injuries. 

Workup for ongoing issues has been largely inconclusive. Plan for evaluation 

including blood cultures, lactic acid, and troponin, urinalysis, urine culture, 

respiratory pathogen analysis, EKG, and chest x-ray. 





Patient's initial lactic acid on feel blood was 4.0.  Repeat i-STAT lactic acid 

was obtained and was 3.0.  Evaluation for sepsis was undertaken.  Patient 

denies a fever at home but has a temperature here 38.1.  


Severe Sepsis/Septic Shock Care Note





The patient presents to the ED with pneumonia identified as a possible acute 

infection.  The patient did have evidence of end-organ dysfunction and met 

criteria for severe sepsis.  This condition was identified by myself at 1635.  

The patients vital signs are 1 0 weight over 62, heart rate 89, respiratory 

rate 16, O2 sat 95%, temperature 38.1 degrees.  The patient has a venous lactic 

acid performed within 3 hours of the identification of severe sepsis which was 

found to be 3.0.  The patient has blood cultures drawn and received Levaquin IV

, per the severe sepsis treatment protocol.   within the past 24 hours.]





The initial lactate was elevated and rechecked within 6 hours of the 

identification time of severe sepsis and found to be:  1.8.








Patient's evaluation and lab findings were discussed with the patient and the 

family.  Patient will be admitted to the hospital.  I am concerned regarding 

his significant lactic acidosis as well as a significant leukocytosis of 22,

000. Patient will require further evaluation and supportive care including most 

likely pulmonary consultation, possible bronchoscopy, and physical therapy 

evaluation.


MDM: 





Differential diagnosis of the patient's weakness was considered including but 

not limited to electrolyte abnormality, anemia, cardiac ischemia, CVA, spinal  

cord abnormality, and infectious causes.


Differential diagnosis for the patient's cough was considered including but not 

limited to viral versus bacterial bronchitis, asthma, COPD, pulmonary emboli, 

upper respiratory infection, lower respiratory infection, and bronchospasm.





- Data Points


Imaging Results: 





CXR:


Impression: 


1. Persistent left basilar consolidation, most likely related to persistent 

complete left lower 


lobe atelectasis or less likely pneumonia, with slight increase in left pleural 

thickening/effusion. 


2. Additional findings as above. 


 


 


 


               Dictated By: Raj Steve MD 


 





Imaging: I viewed and interpreted images myself


Laboratory Results: 


 Laboratory Results





 07/27/18 04:50 





 07/27/18 04:50 








Medications Given: 


 





Albuterol/Ipratropium (Duoneb)  3 ml IH Q6HRS [facility]


   Stop: 01/23/19 00:00


   Last Admin: 07/29/18 06:05 Dose:  3 ml


Aspirin (Aspirin)  325 mg PO DAILY [facility]


   Stop: 01/23/19 08:59


   Last Admin: 07/29/18 09:10 Dose:  325 mg


Atorvastatin Calcium (Lipitor)  40 mg PO DAILY@17 [facility]


   Stop: 01/23/19 16:59


   Last Admin: 07/28/18 17:03 Dose:  40 mg


Carvedilol (Coreg)  6.25 mg PO BIDMEAL [facility]


   Stop: 01/23/19 07:59


   Last Admin: 07/29/18 09:10 Dose:  6.25 mg


Digoxin (Lanoxin)  250 mcg PO DAILY@17 [facility]


   Stop: 01/23/19 16:59


   Last Admin: 07/28/18 17:03 Dose:  250 mcg


Fluticasone Propionate (Flovent Hfa)  2 puffs IH BID [facility]


   Stop: 01/23/19 08:59


   Last Admin: 07/29/18 09:36 Dose:  2 puffs


Guaifenesin (Mucinex)  1,200 mg PO BID [facility]


   Stop: 01/22/19 20:59


   Last Admin: 07/29/18 09:10 Dose:  1,200 mg


Ampicillin Sodium/Sulbactam (Sodium 3 gm/ Sodium Chloride)  100 mls @ 200 mls/

hr IV Q6HRS [facility]


   PRN Reason: Protocol


   Stop: 08/27/18 11:59


   Last Admin: 07/29/18 06:01 Dose:  100 mls


Miscellaneous Information (Patch Removal)  1 ea TD DAILY21 [facility]


   Stop: 01/22/19 20:59


   Last Admin: 07/28/18 21:02 Dose:  1 ea


Miscellaneous Medication (Icy Hot Lidocaine/Menthol 4%/1% Patch)  1 patch TD 

DAILY [facility]


   Stop: 01/22/19 19:14


   Last Admin: 07/29/18 08:40 Dose:  Not Given


Miscellaneous Medication (Calcium/D3/Mag Ox//Abel/Zn [Caltrate+D3 Plus 

Mineral Minis])  1 each PO DAILY [facility]


   Stop: 01/23/19 08:59


   Last Admin: 07/29/18 08:40 Dose:  Not Given


Pantoprazole Sodium (Protonix)  40 mg PO DAILY [facility]


   Stop: 01/23/19 08:59


   Last Admin: 07/29/18 09:10 Dose:  40 mg


Senna/Docusate Sodium (Senokot-S)  1 tab PO PRN PRN


   PRN Reason: Constipation


   Stop: 01/22/19 23:14


   Last Admin: 07/28/18 21:03 Dose:  1 tab


Tamsulosin HCl (Flomax)  0.4 mg PO DAILY@17 [facility]


   Stop: 01/23/19 16:59


   Last Admin: 07/28/18 17:03 Dose:  0.4 mg





Discontinued Medications





Sodium Chloride (Ns)  1,800 mls @ 3,600 mls/hr 30 ml/kg infuse over 30 min (

1800 ml) IV EDNOW ONE


   PRN Reason: Protocol


   Stop: 07/26/18 17:04


   Last Admin: 07/26/18 17:19 Dose:  1,800 mls


Levofloxacin/Dextrose (Levaquin 750 Mg (Premix))  150 mls @ 100 mls/hr IV EDNOW 

ONE


   PRN Reason: Protocol


   Stop: 07/26/18 18:44


   Last Admin: 07/26/18 17:32 Dose:  150 mls


Levofloxacin/Dextrose (Levaquin 750 Mg (Premix))  150 mls @ 100 mls/hr IV DAILY 

[facility]


   PRN Reason: Protocol


   Stop: 08/26/18 08:59


   Last Admin: 07/28/18 08:56 Dose:  150 mls


Prednisone (Prednisone)  40 mg PO DAILY [facility]


   Stop: 01/22/19 19:44


   Last Admin: 07/28/18 08:57 Dose:  40 mg





Point of Care Test Results: 


 Chemistry











  07/26/18 07/26/18





  16:19 16:17


 


POC Sodium  137 mEq/L mEq/L  





   (135-145)  


 


POC Potassium  4.2 mEq/L mEq/L  





   (3.3-5.0)  


 


POC Chloride  97 mEq/L mEq/L  





   ()  


 


POC BUN  16 mg/dL mg/dL  





   (7-23)  


 


POC Creatinine  0.9 mg/dL mg/dL  





   (0.7-1.3)  


 


POC Glucose  165 mg/dL H mg/dL  





   ()  


 


POC Troponin I    0.01 ng/mL ng/mL





    (0.00-0.08) 








 Blood Gas/Lactic Acid-Arterial











  07/26/18 07/26/18 07/26/18





  16:19 16:29 18:01


 


POC Blood Source  VENOUS  VENOUS  VENOUS








 Blood Gas/Lactic Acid-Venous











  07/26/18 07/26/18 07/26/18





  18:01 16:29 16:19


 


POC VBG pH  7.45  H   7.50  H   7.40 





   (7.31-7.42)   (7.31-7.42)   (7.31-7.42) 


 


POC VBG pCO2  39 mmHg L mmHg  33 mmHg L mmHg  44 mmHg mmHg





   (40-44)   (40-44)   (40-44) 


 


POC VBG pO2  TNP   39 mmHg mmHg  TNP 





    (35-40)  


 


POC VBG HCO3  26 mEq/L mEq/L  25 mEq/L mEq/L  28 mEq/L H mEq/L





   (22-26)   (22-26)   (22-26) 


 


POC VBG Total CO2  28 mEq/L H mEq/L  26 mEq/L mEq/L  29 mEq/L H mEq/L





   (21-27)   (21-27)   (21-27) 


 


POC VBG Base Excess  3.0 mEq/L H mEq/L  2.0 mEq/L mEq/L  3.0 mEq/L H mEq/L





   (-2.5-2.5)   (-2.5-2.5)   (-2.5-2.5) 


 


POC Mix VBG O2 Sat  TNP   76 % H %  TNP 





    (65-75)  


 


POC Lactic Acid Suresh  1.7 mmol/L  D mmol/L  3.0 mmol/L H D mmol/L  4.3 mmol/L H 

mmol/L





   (0.7-2.1)   (0.7-2.1)   (0.7-2.1) 








 ISTAT H&H











  07/26/18





  16:19


 


POC Hgb  13.3 gm/dL L gm/dL





   (13.7-17.5) 


 


POC Hct  39 % L %





   (40-51) 











Microbiology Results: 


 MICROBIOLOGY





07/27/18 05:54   Sputum, Induced/Suctioned    - Final


07/27/18 05:54   Sputum, Induced/Suctioned   Sputum Culture - Preliminary


                            Velvet Albicans Presumptive


07/26/18 17:00   Urine,Clean Catch   Urine Culture - Final


                            Four [place] Types


07/26/18 14:40   Blood   Blood Culture - Preliminary


07/26/18 17:26   Blood   Blood Culture - Preliminary








General


Time Seen by Provider: 07/26/18 16:04


Initial Vital Signs: 


 Initial Vital Signs











Temperature (C)  38.1 C   07/26/18 16:30


 


Heart Rate  89   07/26/18 16:30


 


Respiratory Rate  16   07/26/18 16:30


 


Blood Pressure  108/62   07/26/18 16:30


 


O2 Sat (%)  95   07/26/18 16:30








 











O2 Delivery Mode               Room Air














Allergies/Adverse Reactions: 


 





codeine Allergy (Intermediate, Verified 08/10/17 16:03)


 








Home Medications: 














 Medication  Instructions  Recorded


 


Aspirin [Aspirin 325 mg (*)] 325 mg PO DAILY 07/26/18


 


Atorvastatin Calcium [Lipitor 40 40 mg PO DAILY@17 07/26/18





mg (*)]  


 


Calcium/D3/Mag Ox//Abel/Zn 1 each PO DAILY 07/26/18





[Caltrate+D3 Plus Mineral Minis]  


 


Carvedilol [Coreg (*)] 6.25 mg PO BIDMEAL 07/26/18


 


Digoxin 250 mcg PO DAILY@17 07/26/18


 


Fluticasone Hfa 220 Mcg [Flovent 2 puffs IH BID 07/26/18





220 MCG Hfa MDI (*)]  


 


Ibuprofen [Motrin (*)] 200 mg PO HS PRN 07/26/18


 


Multivitamins [Multivitamin (*)] 1 each PO DAILY 07/26/18


 


Omeprazole 20 mg PO DAILY@17 07/26/18


 


Sennosides/Docusate Sodium 1 each PO PRN PRN 07/26/18





[Senna-Docusate Sodium Tablet]  


 


Tamsulosin HCl [Flomax 0.4 MG (*)] 0.4 mg PO DAILY@17 07/26/18














Departure





- Departure


Disposition: [facility]


Clinical Impression: 


 Weight loss, Weakness, Chest congestion, Collapsed lung





Sepsis


Qualifiers:


 Sepsis type: sepsis due to unspecified organism Qualified Code(s): A41.9 - 

Sepsis, unspecified organism





Condition: Fair


Report Scribed for: Keira Evangelista


Report Scribed by: Latoya Freitas


Date of Report: 07/26/18


Time of Report: 17:30


Physician Review and Approval Statement: Portions of this note were transcribed 

by a medical scribe.  I personally performed a history, physical exam, medical 

decision making, and confirmed accuracy of information the transcribed note.

## 2018-07-26 NOTE — GHP
[f 
rep st]



                                                            HISTORY AND PHYSICAL





DATE OF ADMISSION:  07/26/2018



CHIEF COMPLAINT:  Weakness.



HISTORY OF PRESENT ILLNESS:  This is an 88-year-old man who is admitted with 
weakness.  He was on the toilet today and was unable to get up.  He called his 
wife to help him, after he stood he slowly slumped down and sat on the floor.  
Because of that he was brought to the emergency department.  His recent medical 
course has been somewhat complicated.  He has had somewhat of a chronic cough.  
He did have a beryllium exposure in the past.  He saw Dr. Sky in April for 
some abnormalities which were seen on chest x-ray and subsequent CT scan.  
These included some pulmonary nodules as well as indeterminate lesion in the 
base of his left chest.  When he saw Dr. Sky, they discussed the possibility 
of a bronchoscopy however had decided against it given his age as well as the 
fact that he is on Plavix.  Cultures at that time were ordered as well as 
sputum cytology, however they were never obtained.  Since then, he felt broke 3 
left ribs, was in the hospital overnight, treated conservatively for that.  He 
did not require a chest tube.  He has had ongoing imaging which has shown a 
left lobe collapse since then.  He comes in today as above with worsening 
weakness.  He also notes a 40 pound weight loss over the last 6 to 8 months.



PAST MEDICAL/SURGICAL HISTORY:  

1.  Coronary artery disease, status post stent in June of 2017, as well as a 
CABG.

2.  Aortic valve replacement.

3.  Sciatica.

4.  Hypertension.

5.  Atrial fibrillation.  Not on anticoagulation.

6.  Hyperlipidemia.

7.  BPH.

8.  Hernia repair.



MEDICATIONS:  Please see medication reconciliation.



ALLERGIES:  Codeine.



FAMILY HISTORY:  Reviewed and noncontributory.



SOCIAL HISTORY:  He lives with his wife.  He has not had a drink of alcohol for 
about 50 years.  He has never smoked.



REVIEW OF SYSTEMS:  A 10-point review of systems is conducted and is negative 
except per HPI.



PHYSICAL EXAM:  Blood pressure last was 99/65, heart rate 66, respiration rate 
16, saturating 92% on room air.  Initial temperature was 38.  GENERAL:  The 
patient is a pleasant man who clearly is having trouble coughing up his sputum.
  He is in no real distress though.  HEENT:  Shows him to be normocephalic, 
atraumatic.  CARDIOVASCULAR:  Shows a regular rate and rhythm.  No murmurs, rubs
, or gallops.  CHEST:  Shows no ecchymoses over the left rib they are mildly 
tender to palpation.  PULMONARY:  Diminished breath sounds in the left base.  
He has some rhonchi that is best heard anteriorly.  ABDOMINAL:  Soft, nontender
, nondistended.  SKIN:  Shows no rash.  :  Showed no Gordon.  NEUROLOGIC:  
Shows him to be alert and oriented x3.  He is moving all extremities.  
PSYCHIATRIC:  Shows normal mood and affect.



LABS:  White count is 22,000.  Hemoglobin is 11.9, platelets are 574, INR is 
1.1.  Initial lactate was 4.3, down to 1.7.  Urinalysis shows trace bacteria.  
Digoxin is 1.4.



DATA:  

1.  Discussed this with Dr. Evangelista as well as Dr. Weir.  Dr. Reyes will 
consult tomorrow.

2.  Chest x-ray, which I personally reviewed and interpreted shows left base 
consolidation similar to previous.

3.  EKG, which I personally viewed and interpreted, shows sinus rhythm.  He has 
a first degree AV block.  He has a left bundle branch block.



IMPRESSION AND PLAN:  

1.  Pulmonary:  Clearly has a chronic problem with what I think is an acute 
pneumonia.  Acute pneumonia very likely may be postobstructive versus due to 
decreased mucous clearance in the setting of rib fractures and splinting from 
pain.  For the acute pneumonia, I will treat him with Levaquin as well as 
steroids, inhaled bronchodilators, Mucinex, pulmonary hygiene.  I discussed 
this with Dr. Reyes.  He may also benefit from a bronch to clear his mucus.  He 
would not be eligible for a biopsy yet as he is currently on Plavix.  I will 
hold his Plavix, however.  Dr. Reyes will consult tomorrow.

2.  Coronary artery disease, status post stent in June of 2017 as well as a 
coronary artery bypass grafting:  Would recommend discussing with Cardiology 
regarding holding his Plavix although I think it is safe as he is more than a 
year out from his stent.  He is not having any chest pain, or other coronary 
symptoms.

3.  Paroxysmal atrial fibrillation:  Currently normal sinus rhythm.  He is not 
on anticoagulation.

4.  Hypertension:  Will continue his medications.

5.  Hyperlipidemia:  Statin.

6.  BPH:  Follow for urinary retention.

7.  Code status:  He would like to be Full Code, Full Tube.

8.  Venous thromboembolism risk:  He is moderate risk.  We will hold his 
Lovenox while we are awaiting decision on bronchoscopy.





Job #:  [id number]

MTDD

## 2018-07-26 NOTE — CPEKG
Heart Rate: 88

RR Interval: 682

P-R Interval: 232

QRSD Interval: 132

QT Interval: 332

QTC Interval: 402

P Axis: -16

QRS Axis: -53

T Wave Axis: 125

EKG Severity - ABNORMAL ECG -

EKG Impression: SINUS RHYTHM

EKG Impression: FIRST DEGREE AV BLOCK

EKG Impression: LEFT BUNDLE BRANCH BLOCK

Electronically Signed By: Keira Evangelista 26-Jul-2018 22:35:14

## 2018-07-27 LAB — PLATELET # BLD: 457 10^3/UL (ref 150–400)

## 2018-07-27 PROCEDURE — 0BCB8ZZ EXTIRPATION OF MATTER FROM LEFT LOWER LOBE BRONCHUS, VIA NATURAL OR ARTIFICIAL OPENING ENDOSCOPIC: ICD-10-PCS | Performed by: INTERNAL MEDICINE

## 2018-07-27 PROCEDURE — 0BDB8ZX EXTRACTION OF LEFT LOWER LOBE BRONCHUS, VIA NATURAL OR ARTIFICIAL OPENING ENDOSCOPIC, DIAGNOSTIC: ICD-10-PCS | Performed by: INTERNAL MEDICINE

## 2018-07-27 PROCEDURE — 3E1F88X IRRIGATION OF RESPIRATORY TRACT USING IRRIGATING SUBSTANCE, VIA NATURAL OR ARTIFICIAL OPENING ENDOSCOPIC, DIAGNOSTIC: ICD-10-PCS | Performed by: INTERNAL MEDICINE

## 2018-07-27 PROCEDURE — 0BBJ8ZX EXCISION OF LEFT LOWER LUNG LOBE, VIA NATURAL OR ARTIFICIAL OPENING ENDOSCOPIC, DIAGNOSTIC: ICD-10-PCS | Performed by: INTERNAL MEDICINE

## 2018-07-27 PROCEDURE — 0B9B8ZX DRAINAGE OF LEFT LOWER LOBE BRONCHUS, VIA NATURAL OR ARTIFICIAL OPENING ENDOSCOPIC, DIAGNOSTIC: ICD-10-PCS | Performed by: INTERNAL MEDICINE

## 2018-07-27 RX ADMIN — GUAIFENESIN SCH MG: 600 TABLET, EXTENDED RELEASE ORAL at 20:53

## 2018-07-27 RX ADMIN — FLUTICASONE PROPIONATE SCH PUFFS: 220 AEROSOL, METERED RESPIRATORY (INHALATION) at 11:37

## 2018-07-27 RX ADMIN — ASPIRIN SCH MG: 325 TABLET, FILM COATED ORAL at 09:00

## 2018-07-27 RX ADMIN — CARVEDILOL SCH: 6.25 TABLET, FILM COATED ORAL at 17:49

## 2018-07-27 RX ADMIN — IPRATROPIUM BROMIDE AND ALBUTEROL SULFATE SCH: .5; 3 SOLUTION RESPIRATORY (INHALATION) at 16:07

## 2018-07-27 RX ADMIN — MENTHOL SCH PATCH: 1 SPRAY TOPICAL at 08:54

## 2018-07-27 RX ADMIN — ATORVASTATIN CALCIUM SCH MG: 40 TABLET, FILM COATED ORAL at 18:56

## 2018-07-27 RX ADMIN — TAMSULOSIN HYDROCHLORIDE SCH MG: 0.4 CAPSULE ORAL at 18:55

## 2018-07-27 RX ADMIN — PANTOPRAZOLE SODIUM SCH MG: 40 TABLET, DELAYED RELEASE ORAL at 09:00

## 2018-07-27 RX ADMIN — DIGOXIN SCH MCG: 250 TABLET ORAL at 18:55

## 2018-07-27 RX ADMIN — FLUTICASONE PROPIONATE SCH: 220 AEROSOL, METERED RESPIRATORY (INHALATION) at 08:25

## 2018-07-27 RX ADMIN — GUAIFENESIN SCH MG: 600 TABLET, EXTENDED RELEASE ORAL at 09:00

## 2018-07-27 RX ADMIN — CARVEDILOL SCH: 6.25 TABLET, FILM COATED ORAL at 09:02

## 2018-07-27 RX ADMIN — FLUTICASONE PROPIONATE SCH PUFFS: 220 AEROSOL, METERED RESPIRATORY (INHALATION) at 21:25

## 2018-07-27 RX ADMIN — IPRATROPIUM BROMIDE AND ALBUTEROL SULFATE SCH ML: .5; 3 SOLUTION RESPIRATORY (INHALATION) at 05:47

## 2018-07-27 RX ADMIN — IPRATROPIUM BROMIDE AND ALBUTEROL SULFATE SCH ML: .5; 3 SOLUTION RESPIRATORY (INHALATION) at 11:10

## 2018-07-27 RX ADMIN — IPRATROPIUM BROMIDE AND ALBUTEROL SULFATE SCH ML: .5; 3 SOLUTION RESPIRATORY (INHALATION) at 21:25

## 2018-07-27 NOTE — ASMTCMCOM
CM Note

 

CM Note                       

Notes:

This CM met with patient's LEIGH Maier [phone] in the ER yesterday prior to patient's 

admission.  Darrion confirms that patient and his wife do live alone and patient has recently started 

HH services with [facility] .  Darrion and Tina (pt's daughter) [phone] live "down the road" and 


do check in with patient often.



Today I contacted Lara at [facility]  [phone] and have confirmed that patient is active 

with PT/RN/CNA HH services.



CM to follow.

 

Date Signed:  07/27/2018 10:40 AM

Electronically Signed By:Ariana Garcia RN

## 2018-07-27 NOTE — GCON
[f 
rep st]



                                                                    CONSULTATION





PULMONARY CONSULTATION



DATE OF CONSULTATION:  07/27/2018





REASON FOR CONSULTATION:  Left lower lobe atelectasis/infiltrate/consolidation.



HISTORY:  The patient is a very pleasant, 88-year-old gentleman who was 
admitted yesterday with weakness.  This was associated with increasing cough, 
pulmonary congestion, and white sputum.  He denied any fevers, chills or sweats
, but he has felt sick.  He is a never smoker.  He worked as a  in the 
past, and had exposure to beryllium making bombs in [place] in the 50s.  He 
was a  at that time.  He was seen by Dr. Sky 3 months ago for 
abnormalities on chest x-ray and CT scan.  He had patchy infiltrates in the 
left base at that time.  He also had a more upper lobe loculated effusion, and 
an upper lobe lesion.  These latter 2 issues have for the most part resolved.  
He declined bronchoscopy at this time.  He has since fallen, and broke 3 ribs 
also on the left.  He was hospitalized for observation overnight.  He has had 3 
CT scans of the chest since April.  In addition to his increasing cough, 
congestion, and wheezing, he has had a 30-40 pound weight loss over the last 6 
months.



PAST MEDICAL HISTORY:  Remarkable for coronary artery disease.  He is status 
post CABG.  There is a history of aortic valve replacement, hypertension, 
paroxysmal atrial fibrillation, and hyperlipidemia.



DRUG ALLERGIES:  Codeine.



FAMILY HISTORY:  Noncontributory.



SOCIAL HISTORY:  The patient is , lives with his wife.  Alcohol and 
tobacco are negative.



REVIEW OF SYSTEMS:  A 10-point review of systems is negative except as 
mentioned above.  He does take a baby aspirin and has done so for years.  This 
has not recently been held.  He is on inhaled therapies including albuterol in 
the past, and most recently Flovent.  He feels these help, but does not recall 
being told he had asthma.  There is no history of COPD.



PHYSICAL EXAMINATION:  GENERAL:  Reveals an elderly gentleman who is in no 
distress.  He is on no oxygen.  He is conversant and appropriate.  VITAL SIGNS:
  Blood pressure is approximately 110/55, heart rate 60, and regular.  
Respiratory rate is 18.  He has a loose cough that is intermittent.  He is 
afebrile.  HEENT:  Unremarkable for lymphadenopathy or thyromegaly.  There is 
no significant nasal congestion, not obvious postnasal drainage.  There is no 
jugular venous distention.  CHEST:  Clear peripherally.  With cough, some 
central rhonchi are noted.  There are no wheezes.  Expiratory phase is perhaps 
mildly prolonged.  There are no rales.  Breath sounds are more diminished at 
the left base compared to the right.  There are no consolidative changes, and 
there is no egophony.  HEART:  Bradycardic, regular, and without gallop.  A 
systolic murmur is present.  ABDOMEN:  Soft, nontender.  Bowel sounds are 
present.  EXTREMITIES:  Without edema, cords, or tenderness.  NEUROLOGIC:  
Examination is intact.  He moves all extremities equally.  He was not ambulated.



DATABASE:  Radiologic studies are as outlined above.



LABORATORY:  White blood cell count is 21,000, hematocrit 31, platelets normal.
  PT and PTT were within normal limits on admission.  Sodium is 134, potassium 
4.4, BUN 15 with a creatinine 0.6.  Glucose is 131, calcium 8.5.  Lactate on 
admission was 4.3, and dropped to 1.7.  Procalcitonin was not obtained.  



Urinalysis was negative for white blood cells.  Digoxin level was 1.4.



ASSESSMENT:  

1.  Progressive changes in the left lower lobe, now with more complete 
atelectasis as opposed to patchy infiltrates in that area.  I do not see a 
mass.  Bronchi appear for the most part to be patent.  However, some mucus 
appears to be present in left lower lobe bronchi.  Upper lobe changes and an 
effusion associated with his previous rib fractures appear to have resolved.  I 
have discussed all the issues with the patient.  Bronchoscopy does seem 
indicated to assess left lower lobe airway, and to obtain deep cultures and 
cytology.  Malignancy, however, based on the appearance, seems less likely.

2.  Chronic bronchitis.  This is associated with cough and mucus that goes back 
at least 1 year, and has been somewhat worse over the last several months.  The 
mucus is largely white, and he is not manifesting clear symptoms of an 
infectious process.  However, his being treated for pneumonia with Unasyn, and 
his presentation with weakness and left lower lobe consolidation could 
certainly be related to a pneumonia in this elderly gentleman.  Putting him 
back on albuterol may be of benefit.  A component of reactive airways disease 
may be present.

3.  History of coronary artery disease and other problems as outlined in the 
history of present illness. 





PLAN:

Bronchoscopy will be performed later today.  No biopsies are planned unless 
endobronchial lesions are noted.  Brush biopsy samples, washings, and lavage 
will be obtained for cultures and cytologies.  Following of the procedure, 
albuterol will be added by nebulizer. 



Further plans and recommendations will be made based after the bronchoscopy and 
its findings.





Job #:  [id number]

MTDD

## 2018-07-27 NOTE — GPN
[f 
rep st]



                                                                 PROCEDURE NOTE





PROCEDURE:  Bronchoscopy.



INDICATION:  Chronic left lower lobe abnormalities now with atelectasis.  This 
procedure is being done to investigate the cause of these changes, obtain 
cultures and cytologies.



DESCRIPTION OF PROCEDURE:  The procedure was done in the endoscopy unit.  
Informed consent was obtained from the patient.  Appropriate time-out was 
performed.  Conscious sedation included 4 mg of Versed and 100 mcg of fentanyl.
  5 cc of 4% lidocaine was used to topically anesthetize the posterior 
oropharynx.  Approximately 20 cc of 1% lidocaine was used for topical 
anesthesia of the tracheobronchial tree. 



The fiberoptic bronchoscope was passed via bite block orally into the larynx.  
The vocal cords were identified.  They move normally with respiration.  The 
bronchoscope was advanced into the trachea and the lower tracheobronchial tree 
bilaterally.  All areas were observed to at least the subsegmental level.  The 
right side was entirely normal.  The left upper lobe and lingula were normal.  
There were secretions retained in the right lower lobe.  These were purulent 
with some mucus plugging.  Secretions were removed with suction and lavage.  
The underlying mucosa was quite erythematous and appeared edematous.  The 
mucosa was somewhat friable.  It was unclear whether this friability was 
related to infection and mucous plugging in that area or possibly an underlying 
abnormality such as mucosal malignancy.  Washes and lavage from the left lower 
lobe were taken and sent for cultures and cytologies.  A brush biopsy sample 
was taken for cytologies and endobronchial biopsies from the left lower lobe 
were sent for pathologic examination. 



The patient tolerated the procedure well.  Vital signs and oxygen saturations 
on supplemental oxygen remained normal throughout the procedure.



IMPRESSION:  

1.  Mucus plugging.  This was present in the left lower lobe consistent with 
possible pneumonia.  

2.  His underlying left lower lobe mucosal changes are as described above.  
These could be related to inflammation/infection, however an underlying 
abnormality such as malignancy cannot be excluded at this time.  Appropriate 
samples were sent to the laboratory.





Job #:  [id number]

MTDD

## 2018-07-27 NOTE — PDMN
Medical Necessity


Medical necessity: Pt meets inpt criteria per MD order and Pulmonary Disease GRG

, est LOS>2MN for ongoiong evaluation of left lower lobe atelectasis/

consolidation/infiltrate, pulmonary consult, bronchoscopy pending- will obtain 

cultures and cytology, pt presented w/weakness, fever, cough, WBC's 21.27, 

likely acute PNA- on IV ABX, pt w/recent hx of rib fractures and splinting from 

pain, comorbid adv age, signif wt loss over past 6-8mos. PT/OT evals pending.

## 2018-07-27 NOTE — ASMTCMCOM
CM Note

 

CM Note                       

Notes:

Met with patient, his wife and son in law. Per the son in law the couple has been struggling to 

meet all needs at home on their own and n fact had HHC set up to start but he unfortunately was 

admitted here, I have placed a referral to [facility]. The family has resources for other area 

agencies to assist with other needs. CM to follow.



Plan: Home with HHC when medically cleared for discharge to home.

 

Date Signed:  07/27/2018 11:09 AM

Electronically Signed By:Lillian Fields RN

## 2018-07-27 NOTE — WOCRNPDOC
FERMINCRDARIA Advanced Assessment Note





- Skin Integrity Problem, Advanced Assess


  ** Right Foot


Dressing Type: Gauze, Courtney


Dressing Description: Clean/Dry, Intact


Exudate Amount: None


Integumentary Issue Intervention: Visualized Under Dressing


Laurie Wound Tissue: Calloused


Laurie Wound Swelling: None


Wound Bed Color: Pink, Red, Yellow


Wound Bed Constitution: Granulation Tissue (40%), Red/Pink - Non Granular 

Tissue (60%)


Wound Edges: Attached


Site Measurement - Head-to-Toe Length X Width X Depth (cm): 1.2x1.5x0.2


Skin Integrity Problem Comment: Chronic wound followed by Dr. Hong (podiatrist

) for several years. Patient reports having failed mulitple grafts to the area. 

Wound is located on the first metatarsal head and due to foot deformities is 

constantly loaded and rubbed when patient ambulates. No sign of infection. 

Continue plan of care from poidiatry when patient returns home. In the 

meanwhile will initiate moist wound healing. Wound care will sign off.

## 2018-07-27 NOTE — HOSPPROG
Hospitalist Progress Note


Assessment/Plan: 





*  Pneumonia with sepsis (POA)


   -Levaquin


*  LLL collapse due to mucus plugging s/p bronch


   -biopsy/cultures pending


*  Increased lactate - sepsis vs. dehydration


*  CAD/stent/CABG


   -holding plavix for bronch - resume before discharge


*  Unintentional weight loss - 40 pounds


*  Afib


   -digoxin level okay


*  AVR














Subjective: Feels better


Objective: 


 Vital Signs











Temp Pulse Resp BP Pulse Ox


 


 36.4 C   56 L  16   97/51 L  99 


 


 07/27/18 14:05  07/27/18 14:05  07/27/18 15:40  07/27/18 15:40  07/27/18 15:40








 











 07/26/18 07/27/18 07/28/18





 05:59 05:59 05:59


 


Intake Total   150


 


Balance   150








 











PT  15.2 SEC (12.0-15.0)  H  07/26/18  14:45    


 


INR  1.18  (0.83-1.16)  H  07/26/18  14:45    








EKG viewed, my personal interpretation is - LBBB


CXR - LLL infiltrate


 Laboratory Tests











  07/26/18 07/27/18





  14:45 04:50


 


WBC  22.61 H  21.27 H














- Physical Exam


Constitutional: no apparent distress, appears nourished, not in pain


Cardiovascular: regular rate and rhythym, no murmur, rub, or gallop


Respiratory: no respiratory distress, no rales or rhonchi, clear to auscultation


Gastrointestinal: normoactive bowel sounds, soft, non-tender abdomen, no 

palpable masses


Skin: no rashes or abrasions, no fluctuance, no induration


Neurologic: AAOx3, sensation intact bilaterally


Psychiatric: interacting appropriately, not anxious, not encephalopathic, 

thought process linear





ICD10 Worksheet


Patient Problems: 


 Problems











Problem Status Onset


 


Chest congestion Acute  


 


Collapsed lung Acute  


 


Sepsis Acute  


 


Weakness Acute  


 


Weight loss Acute  


 


Cellulitis and abscess of foot Acute  


 


Chest pain Acute  


 


Fall Acute  


 


Rib contusion Acute  


 


Rib fractures Acute

## 2018-07-28 LAB — PLATELET # BLD: 551 10^3/UL (ref 150–400)

## 2018-07-28 RX ADMIN — IPRATROPIUM BROMIDE AND ALBUTEROL SULFATE SCH ML: .5; 3 SOLUTION RESPIRATORY (INHALATION) at 22:01

## 2018-07-28 RX ADMIN — IPRATROPIUM BROMIDE AND ALBUTEROL SULFATE SCH ML: .5; 3 SOLUTION RESPIRATORY (INHALATION) at 17:51

## 2018-07-28 RX ADMIN — AMPICILLIN AND SULBACTAM SCH MLS: 2; 1 INJECTION, POWDER, FOR SOLUTION INTRAMUSCULAR; INTRAVENOUS at 17:04

## 2018-07-28 RX ADMIN — IPRATROPIUM BROMIDE AND ALBUTEROL SULFATE SCH ML: .5; 3 SOLUTION RESPIRATORY (INHALATION) at 06:16

## 2018-07-28 RX ADMIN — IPRATROPIUM BROMIDE AND ALBUTEROL SULFATE SCH ML: .5; 3 SOLUTION RESPIRATORY (INHALATION) at 12:09

## 2018-07-28 RX ADMIN — CARVEDILOL SCH MG: 6.25 TABLET, FILM COATED ORAL at 17:04

## 2018-07-28 RX ADMIN — TAMSULOSIN HYDROCHLORIDE SCH MG: 0.4 CAPSULE ORAL at 17:03

## 2018-07-28 RX ADMIN — FLUTICASONE PROPIONATE SCH PUFFS: 220 AEROSOL, METERED RESPIRATORY (INHALATION) at 09:30

## 2018-07-28 RX ADMIN — CARVEDILOL SCH MG: 6.25 TABLET, FILM COATED ORAL at 08:56

## 2018-07-28 RX ADMIN — PANTOPRAZOLE SODIUM SCH MG: 40 TABLET, DELAYED RELEASE ORAL at 08:57

## 2018-07-28 RX ADMIN — ATORVASTATIN CALCIUM SCH MG: 40 TABLET, FILM COATED ORAL at 17:03

## 2018-07-28 RX ADMIN — AMPICILLIN AND SULBACTAM SCH MLS: 2; 1 INJECTION, POWDER, FOR SOLUTION INTRAMUSCULAR; INTRAVENOUS at 12:23

## 2018-07-28 RX ADMIN — DIGOXIN SCH MCG: 250 TABLET ORAL at 17:03

## 2018-07-28 RX ADMIN — GUAIFENESIN SCH MG: 600 TABLET, EXTENDED RELEASE ORAL at 20:53

## 2018-07-28 RX ADMIN — GUAIFENESIN SCH MG: 600 TABLET, EXTENDED RELEASE ORAL at 08:57

## 2018-07-28 RX ADMIN — FLUTICASONE PROPIONATE SCH PUFFS: 220 AEROSOL, METERED RESPIRATORY (INHALATION) at 22:02

## 2018-07-28 RX ADMIN — ASPIRIN SCH MG: 325 TABLET, FILM COATED ORAL at 08:57

## 2018-07-28 RX ADMIN — MENTHOL SCH PATCH: 1 SPRAY TOPICAL at 08:57

## 2018-07-28 NOTE — ASMTCMCOM
CM Note

 

CM Note                       

Notes:

Spoke w/pt's wife and son in law Darrion re; dc poc. They want pt to dc home w/home care, pt already 

set up with [facility] (RN/PT/CNA). Son in law states they are also looking into arranging private pay 


non skilled assistance as well, CM gave him a brochure for [facility]. [facility] notified that pt may dc 

Sunday.



DC Plan: Home care/ [facility] (RN/PT/CNA)

 

Date Signed:  07/28/2018 12:24 PM

Electronically Signed By:Esther Green RN

## 2018-07-28 NOTE — SOAPPROG
SOAP Progress Note


Assessment/Plan: 


Assessment:





Left lower lobe atelectasis.  Mucous plugging was present.  Left lower lobe 

pneumonia certainly possible.  Left lower lobe bronchial changes present as 

well - may all be secondary to infection.  Cytologies pending to exclude 

malignancy.  On Unasyn.  








Plan:  Await remaining bronchoscopy samples.  Probably back Monday or Tuesday.  

Continue to work with strengthening, physical therapy and occupational therapy.

  Continue antibiotics and bronchopulmonary therapies.  Could go home on 

Augmentin in the next several days from my standpoint.  I can see him in follow-

up in the office.





All the above discussed with the patient and his wife.


























Subjective: 





Doing well.  Says he feels better and is breathing better.  Still somewhat weak


Objective: 





 Vital Signs











Temp Pulse Resp BP Pulse Ox


 


 36.4 C   73   16   119/65   95 


 


 07/28/18 14:45  07/28/18 14:45  07/28/18 14:45  07/28/18 14:45  07/28/18 14:45








 Microbiology











 07/27/18 15:30 Mycobacterial Smear (CAITY) - Final





 Lung Left Lower Lobe - Bronchial Washings 


 


 07/27/18 15:30 Gram Stain - Final





 Lung Left Lower Lobe - Bronchial Washings 








 Laboratory Results





 07/28/18 05:08 





 











 07/27/18 07/28/18 07/29/18





 05:59 05:59 05:59


 


Intake Total  1200 


 


Balance  1200 








 











PT  15.2 SEC (12.0-15.0)  H  07/26/18  14:45    


 


INR  1.18  (0.83-1.16)  H  07/26/18  14:45    








Bronch wash aerobic culture without any pathogens so far.  AFB smear negative.  

Cytologies pending





Physical Exam





- Physical Exam


General Appearance: alert, no apparent distress, thin


EENT: other (On room air)


Neck: normal inspection (No JVD)


Respiratory: lungs clear (Anteriorly), decreased breath sounds (At left base 

compared to the right.), rales (Present at both bases), other (Moving more air 

today on the left.  Can now hear bronchial/consolidative changes with E to A 

sounds)


Cardiac/Chest: regular rate, rhythm


Abdomen: normal bowel sounds, non-tender, soft


Skin: warm/dry, pallor


Extremities: No pedal edema


Neuro/Psych: no motor/sensory deficits (Nonfocal), No cognition abnormalities





ICD10 Worksheet


Patient Problems: 


 Problems











Problem Status Onset


 


Chest congestion Acute  


 


Collapsed lung Acute  


 


Sepsis Acute  


 


Weakness Acute  


 


Weight loss Acute  


 


Cellulitis and abscess of foot Acute  


 


Chest pain Acute  


 


Fall Acute  


 


Rib contusion Acute  


 


Rib fractures Acute

## 2018-07-28 NOTE — HOSPPROG
Hospitalist Progress Note


Assessment/Plan: 





*  Chronic Pneumonia? with sepsis (POA)


* wbc worse.  since this is post-obstructive like pna and he had a course of 

levaquin in may without resolution - will switich to unasyn to transition to 

augmentin outpatient for 2-3 week course.  discussed with ID


* stop prednisone


*  LLL collapse due to mucus plugging s/p bronch


   -biopsy/cultures pending


*  Increased lactate - sepsis vs. dehydration - resolved


*  CAD/stent/CABG


   -holding plavix for bronch - resume before discharge


*  Unintentional weight loss - 40 pounds


*  Afib


   -digoxin level okay


*  AVR


Subjective: feels about the same. maybe a little stronger


Objective: 


 Vital Signs











Temp Pulse Resp BP Pulse Ox


 


 36.5 C   75   16   108/67   94 


 


 07/28/18 08:00  07/28/18 10:15  07/28/18 08:00  07/28/18 10:15  07/28/18 10:15








 Microbiology











 07/27/18 15:30 Gram Stain - Final





 Lung Left Lower Lobe - Bronchial Washings 








 Laboratory Results





 07/28/18 05:08 





 











 07/27/18 07/28/18 07/29/18





 05:59 05:59 05:59


 


Intake Total  1200 


 


Balance  1200 








 











PT  15.2 SEC (12.0-15.0)  H  07/26/18  14:45    


 


INR  1.18  (0.83-1.16)  H  07/26/18  14:45    








ct's and old records reviewed





- Time Spent With Patient


Time Spent with Patient: greater than 35 minutes (counselling patient and 

discussing with ID)


Time Spent with Patient: Greater than 35 minutes spent on this patients care, 

greater than 50% of time spent counseling, educating, and coordinating care 

regarding the above mentioned plan.





- Physical Exam


Constitutional: no apparent distress, appears nourished, not in pain


Eyes: anicteric sclera, EOMI


Ears, Nose, Mouth, Throat: moist mucous membranes, hearing normal


Respiratory: no respiratory distress, reduced air movement (lll. ), No 

expiratory wheeze


Gastrointestinal: normoactive bowel sounds, soft, non-tender abdomen, no 

palpable masses


Skin: warm


Neurologic: AAOx3


Psychiatric: interacting appropriately, not anxious, not encephalopathic, 

thought process linear





ICD10 Worksheet


Patient Problems: 


 Problems











Problem Status Onset


 


Chest congestion Acute  


 


Collapsed lung Acute  


 


Sepsis Acute  


 


Weakness Acute  


 


Weight loss Acute  


 


Cellulitis and abscess of foot Acute  


 


Chest pain Acute  


 


Fall Acute  


 


Rib contusion Acute  


 


Rib fractures Acute

## 2018-07-29 LAB — PLATELET # BLD: 547 10^3/UL (ref 150–400)

## 2018-07-29 RX ADMIN — AMPICILLIN AND SULBACTAM SCH MLS: 2; 1 INJECTION, POWDER, FOR SOLUTION INTRAMUSCULAR; INTRAVENOUS at 06:01

## 2018-07-29 RX ADMIN — PANTOPRAZOLE SODIUM SCH MG: 40 TABLET, DELAYED RELEASE ORAL at 09:10

## 2018-07-29 RX ADMIN — FLUTICASONE PROPIONATE SCH PUFFS: 220 AEROSOL, METERED RESPIRATORY (INHALATION) at 09:36

## 2018-07-29 RX ADMIN — IPRATROPIUM BROMIDE AND ALBUTEROL SULFATE SCH ML: .5; 3 SOLUTION RESPIRATORY (INHALATION) at 11:49

## 2018-07-29 RX ADMIN — CARVEDILOL SCH MG: 6.25 TABLET, FILM COATED ORAL at 09:10

## 2018-07-29 RX ADMIN — ATORVASTATIN CALCIUM SCH MG: 40 TABLET, FILM COATED ORAL at 17:22

## 2018-07-29 RX ADMIN — IPRATROPIUM BROMIDE AND ALBUTEROL SULFATE SCH ML: .5; 3 SOLUTION RESPIRATORY (INHALATION) at 21:40

## 2018-07-29 RX ADMIN — ASPIRIN SCH MG: 325 TABLET, FILM COATED ORAL at 09:10

## 2018-07-29 RX ADMIN — IPRATROPIUM BROMIDE AND ALBUTEROL SULFATE SCH ML: .5; 3 SOLUTION RESPIRATORY (INHALATION) at 06:05

## 2018-07-29 RX ADMIN — FLUTICASONE PROPIONATE SCH PUFFS: 220 AEROSOL, METERED RESPIRATORY (INHALATION) at 21:40

## 2018-07-29 RX ADMIN — AMPICILLIN AND SULBACTAM SCH MLS: 2; 1 INJECTION, POWDER, FOR SOLUTION INTRAMUSCULAR; INTRAVENOUS at 11:30

## 2018-07-29 RX ADMIN — GUAIFENESIN SCH MG: 600 TABLET, EXTENDED RELEASE ORAL at 09:10

## 2018-07-29 RX ADMIN — DIGOXIN SCH MCG: 250 TABLET ORAL at 17:22

## 2018-07-29 RX ADMIN — CARVEDILOL SCH MG: 6.25 TABLET, FILM COATED ORAL at 17:23

## 2018-07-29 RX ADMIN — AMPICILLIN AND SULBACTAM SCH MLS: 2; 1 INJECTION, POWDER, FOR SOLUTION INTRAMUSCULAR; INTRAVENOUS at 00:28

## 2018-07-29 RX ADMIN — GUAIFENESIN SCH MG: 600 TABLET, EXTENDED RELEASE ORAL at 20:20

## 2018-07-29 RX ADMIN — AMPICILLIN AND SULBACTAM SCH MLS: 2; 1 INJECTION, POWDER, FOR SOLUTION INTRAMUSCULAR; INTRAVENOUS at 17:22

## 2018-07-29 RX ADMIN — MENTHOL SCH: 1 SPRAY TOPICAL at 08:40

## 2018-07-29 RX ADMIN — AMPICILLIN AND SULBACTAM SCH MLS: 2; 1 INJECTION, POWDER, FOR SOLUTION INTRAMUSCULAR; INTRAVENOUS at 23:44

## 2018-07-29 RX ADMIN — TAMSULOSIN HYDROCHLORIDE SCH MG: 0.4 CAPSULE ORAL at 17:22

## 2018-07-29 RX ADMIN — IPRATROPIUM BROMIDE AND ALBUTEROL SULFATE SCH ML: .5; 3 SOLUTION RESPIRATORY (INHALATION) at 17:46

## 2018-07-29 NOTE — SOAPPROG
SOAP Progress Note


Assessment/Plan: 


Assessment:





Left lower lobe atelectasis.  Mucous plugging was present.  Left lower lobe 

pneumonia certainly possible.  Left lower lobe bronchial changes present as 

well - may all be secondary to infection.  Cytologies pending to exclude 

malignancy.  On Unasyn.  





CHRISTIANSEN.  On duo nebs and Flovent.  Guaifenesin for secretions.  Stable.








Plan:  Await remaining bronchoscopy results:  Final cultures and cytologies.  

Probably back Monday or Tuesday.  Continue to work with strengthening, physical 

therapy and occupational therapy.  Continue antibiotics and bronchopulmonary 

therapies.  Repeat two view chest x-ray tomorrow.  Could go home on Augmentin 

in the next several days from my standpoint.  I can see him in follow-up in the 

office.








All the above discussed with the patient and his wife.

















Subjective: 





Feels better, little bit stronger.  Still with some pulmonary congestion, but 

overall better


Objective: 





 Vital Signs











Temp Pulse Resp BP Pulse Ox


 


 36.6 C   66   16   111/56 L  96 


 


 07/29/18 15:50  07/29/18 17:45  07/29/18 17:45  07/29/18 17:23  07/29/18 17:45








 Microbiology











 07/27/18 15:30 Gram Stain - Final





 Lung Left Lower Lobe - Bronchial Washings Bronchial Washings Culture - Final





    Velvet Albicans


 


 07/27/18 15:30 Mycobacterial Smear (CAITY) - Final





 Lung Left Lower Lobe - Bronchial Washings 








 Laboratory Results





 07/29/18 04:41 





 07/29/18 04:41 





 











 07/28/18 07/29/18 07/30/18





 05:59 05:59 05:59


 


Intake Total 1200 420 300


 


Output Total   100


 


Balance 1200 420 200








 











PT  15.2 SEC (12.0-15.0)  H  07/26/18  14:45    


 


INR  1.18  (0.83-1.16)  H  07/26/18  14:45    








Bronchoscopy:  No additional information.  Candida from the aerobic culture is 

not a pathogen.  No specific organisms identified so far.  Cytologies pending.





Physical Exam





- Physical Exam


General Appearance: alert, no apparent distress


EENT: other (On room air)


Neck: normal inspection


Respiratory: decreased breath sounds (At left base), rales (Few rales at right 

base.  Few rales at left however breath sounds are diminished at the left base 

compared to the right, with some e to a changes.  Air movement is not as good 

today possibly indicating some recurrent mucus plugging?), rhonchi (Centrally 

with cough), No respiratory distress


Cardiac/Chest: regular rate, rhythm, systolic murmur


Abdomen: normal bowel sounds, non-tender, soft


Skin: warm/dry, pallor


Extremities: No pedal edema


Neuro/Psych: no motor/sensory deficits, No cognition abnormalities





ICD10 Worksheet


Patient Problems: 


 Problems











Problem Status Onset


 


Cellulitis and abscess of foot Acute  


 


Chest pain Acute  


 


Fall Acute  


 


Rib contusion Acute  


 


Rib fractures Acute  


 


Weight loss Acute  


 


Weakness Acute  


 


Chest congestion Acute  


 


Collapsed lung Acute  


 


Sepsis Acute

## 2018-07-29 NOTE — HOSPPROG
Hospitalist Progress Note


Assessment/Plan: 





*  Chronic Pneumonia? with sepsis (POA)


* wbc better today.  since this is post-obstructive like pna and he had a 

course of levaquin in may without resolution - will switich to unasyn to 

transition to augmentin outpatient for 2-3 week course.  discussed with ID


* would like to see WBC clearly improving before dc


* swallow eval also ordered


*  LLL collapse due to mucus plugging s/p bronch


   -biopsy/cultures pending


*  Increased lactate - sepsis vs. dehydration - resolved


*  CAD/stent/CABG


   -holding plavix for bronch - resume before discharge


   -watch BP - may need to lower coreg dose


*  Unintentional weight loss - 40 pounds


*  Afib


   -digoxin level okay


*  AVR


* weakness


* looks like will go home with home health


Subjective: feeling better


Objective: 


 Vital Signs











Temp Pulse Resp BP Pulse Ox


 


 36.3 C   74   16   99/57 L  93 


 


 07/29/18 08:00  07/29/18 09:10  07/29/18 08:00  07/29/18 09:10  07/29/18 08:00








 Microbiology











 07/27/18 15:30 Mycobacterial Smear (CAITY) - Final





 Lung Left Lower Lobe - Bronchial Washings 


 


 07/27/18 15:30 Gram Stain - Final





 Lung Left Lower Lobe - Bronchial Washings 








 Laboratory Results





 07/29/18 04:41 





 07/29/18 04:41 





 











 07/28/18 07/29/18 07/30/18





 05:59 05:59 05:59


 


Intake Total 1200 420 


 


Output Total   100


 


Balance 1200 420 -100








 











PT  15.2 SEC (12.0-15.0)  H  07/26/18  14:45    


 


INR  1.18  (0.83-1.16)  H  07/26/18  14:45    














- Physical Exam


Constitutional: no apparent distress, appears nourished, not in pain


Eyes: anicteric sclera, EOMI


Ears, Nose, Mouth, Throat: moist mucous membranes, hearing normal


Cardiovascular: regular rate and rhythym


Respiratory: no respiratory distress, clear to auscultation, reduced air 

movement (left base)


Gastrointestinal: normoactive bowel sounds, soft, non-tender abdomen, no 

palpable masses


Skin: warm


Neurologic: AAOx3


Psychiatric: interacting appropriately, not anxious, not encephalopathic, 

thought process linear





ICD10 Worksheet


Patient Problems: 


 Problems











Problem Status Onset


 


Chest congestion Acute  


 


Collapsed lung Acute  


 


Sepsis Acute  


 


Weakness Acute  


 


Weight loss Acute  


 


Cellulitis and abscess of foot Acute  


 


Chest pain Acute  


 


Fall Acute  


 


Rib contusion Acute  


 


Rib fractures Acute

## 2018-07-30 VITALS — DIASTOLIC BLOOD PRESSURE: 63 MMHG | SYSTOLIC BLOOD PRESSURE: 122 MMHG

## 2018-07-30 LAB — PLATELET # BLD: 355 10^3/UL (ref 150–400)

## 2018-07-30 RX ADMIN — MENTHOL SCH: 1 SPRAY TOPICAL at 08:14

## 2018-07-30 RX ADMIN — DIGOXIN SCH MCG: 250 TABLET ORAL at 17:13

## 2018-07-30 RX ADMIN — PANTOPRAZOLE SODIUM SCH MG: 40 TABLET, DELAYED RELEASE ORAL at 08:14

## 2018-07-30 RX ADMIN — AMPICILLIN AND SULBACTAM SCH MLS: 2; 1 INJECTION, POWDER, FOR SOLUTION INTRAMUSCULAR; INTRAVENOUS at 17:27

## 2018-07-30 RX ADMIN — CARVEDILOL SCH MG: 6.25 TABLET, FILM COATED ORAL at 08:14

## 2018-07-30 RX ADMIN — AMPICILLIN AND SULBACTAM SCH MLS: 2; 1 INJECTION, POWDER, FOR SOLUTION INTRAMUSCULAR; INTRAVENOUS at 12:07

## 2018-07-30 RX ADMIN — TAMSULOSIN HYDROCHLORIDE SCH MG: 0.4 CAPSULE ORAL at 17:12

## 2018-07-30 RX ADMIN — CARVEDILOL SCH MG: 6.25 TABLET, FILM COATED ORAL at 17:12

## 2018-07-30 RX ADMIN — ASPIRIN SCH MG: 325 TABLET, FILM COATED ORAL at 08:14

## 2018-07-30 RX ADMIN — ATORVASTATIN CALCIUM SCH: 40 TABLET, FILM COATED ORAL at 17:46

## 2018-07-30 RX ADMIN — FLUTICASONE PROPIONATE SCH PUFFS: 220 AEROSOL, METERED RESPIRATORY (INHALATION) at 10:18

## 2018-07-30 RX ADMIN — GUAIFENESIN SCH MG: 600 TABLET, EXTENDED RELEASE ORAL at 08:13

## 2018-07-30 RX ADMIN — IPRATROPIUM BROMIDE AND ALBUTEROL SULFATE SCH ML: .5; 3 SOLUTION RESPIRATORY (INHALATION) at 10:18

## 2018-07-30 RX ADMIN — IPRATROPIUM BROMIDE AND ALBUTEROL SULFATE SCH ML: .5; 3 SOLUTION RESPIRATORY (INHALATION) at 15:45

## 2018-07-30 RX ADMIN — IPRATROPIUM BROMIDE AND ALBUTEROL SULFATE SCH ML: .5; 3 SOLUTION RESPIRATORY (INHALATION) at 06:25

## 2018-07-30 RX ADMIN — AMPICILLIN AND SULBACTAM SCH MLS: 2; 1 INJECTION, POWDER, FOR SOLUTION INTRAMUSCULAR; INTRAVENOUS at 05:31

## 2018-07-30 NOTE — GCON
[f rep st]



                                                                    CONSULTATION





INFECTIOUS DISEASE CONSULTATION



DATE OF CONSULTATION:  07/30/2018





PHYSICIAN REQUESTING CONSULTATION:  Yoni Villalba MD.



REASON FOR CONSULTATION:  Chronic pneumonia left lower lobe.



HISTORY OF PRESENT ILLNESS:  An 88-year-old man who was admitted on July 26th with progressive weakne
ss, weight loss, cough, and fever.  The patient's overall problems start over the last year, when he 
has had progressive weight loss.  He has general anorexia and difficulty eating and described progres
sive dysphagia for solid foods.  Over the last 3 months, he has had more rapid weight loss and develo
ped dyspnea on exertion and a productive cough.  This became more severe and triggered his admission.
  He has been evaluated as an outpatient by Pulmonary, and at that time, the patient deferred further
 workup with bronchoscopy.  Since admission, patient has undergone bronchoscopy on July 27, which dem
onstrated erythematous mucosa and mucus plugging.  Cultures were taken from that sample, but patient 
had been on antibiotics for 2 days.  Cultures show Candida and the Gram stain was 1+ GNR and 1+ GPC. 
 AFB smear was negative.  Fungal and AFB cultures are still pending.  Path is also pending.  Initiall
y, patient was given levofloxacin from the 26th to 28th, and then changed to Unasyn.  Today, patient 
states that he feels 60% better and attributes this to improve ease of breathing and decreased cough,
 as well as increased appetite.  He denies night sweats, fevers over the 3 months except at time of a
dmission.  He has no other GI symptoms other than constipation.  No bright red blood per rectum or me
shawna.  He is a lifelong nonsmoker, but have did have beryllium exposure in the 1950s.  Reviewing his 
laboratory from May of 2018, he had a worsening leukocytosis with a white count initially at 14.  Thi
s was 22, and peaked at 28 during his hospitalization.  He also has thrombocytosis.  Both have improv
ed over hospitalization with antibiotic therapy.  He also received a prednisone burst during hospital
ization from the 26th to the 28th.  The patient strongly desires discharge to home today.  



History source is records, patient, and his family, who are present during my exam and history.



PAST MEDICAL HISTORY:  Atrial fibrillation, coronary artery disease, status post CABG 11 years ago, h
yperlipidemia, peripheral neuropathy, sinusitis, mitral valve repair.



SOCIAL HISTORY:  The patient was a , had beryllium exposure.  He has been  60 years.  He
 was raised in [place].  He lived in [place], [place], and has been in [place] for 52 years.  N
ever has smoked tobacco.  Does not use alcohol.  No recent international travel.



FAMILY HISTORY:  Positive for high cholesterol.



ALLERGIES:  To codeine.  No antibiotics.



MEDICATIONS:  Aspirin, atorvastatin, carvedilol, Plavix, digoxin, lisinopril, tamsulosin, calcium, an
d he has been receiving Unasyn 3 g IV q.8 since July 28, and received levofloxacin from the 26th to t
he 28th.



REVIEW OF SYSTEMS:  A complete 10-point review of systems was performed and is negative except as men
tioned in the HPI.



PHYSICAL EXAM:  VITAL SIGNS:  Blood pressure 131/56, heart rate is 65, respiratory rate is 16, satura
tion 96% on room air, temperature 37.1.  His T-max is 38.1.  GENERAL:  This is a very pleasant male. 
 He is hard-of-hearing, but conversational and fully oriented.  HEENT:  He has reactive pupils.  No c
onjunctival hemorrhages.  Oropharynx notable for very dry mucous membranes and fair dentition.  No ob
vious caries.  No ulcerations or exudates.  Uvula is slightly deviated to the right.  CARDIOVASCULAR:
  Regular rate with a 2/6 systolic murmur.  CHEST:  Clear to auscultation bilaterally.  ABDOMEN:  Sof
t, nontender.  Bowel sounds are present.  EXTREMITIES:  Patient had mild edema in all 4 extremities. 
 No nail findings.  No peripheral stigmata of endocarditis.  SKIN:  No rashes.  NEUROLOGIC:  He is al
ert and oriented x4.  Moving all 4 extremities equally.



LABORATORY:  Microbiology, blood cultures from July 26th are no growth to date.  Respiratory panel PC
R is negative.  Sputum culture showed C albicans.  Bronchial washing left lower lobe showed Candida a
lbicans.  Otherwise, fungal and mycobacterial cultures are pending.  CT scan was personally reviewed 
by me, which demonstrated some scattered granulomatous disease/consolidation of the left lower lobe. 
 Laboratory:  White count 07/30/2018, 18.5; hematocrit 35, platelets of 355, 83% neutrophils, 4% lymp
hocytes, 11% monocytes.  Creatinine is 0.7.  Digoxin 1.4.  Urinalysis was unremarkable.



ASSESSMENT AND PLAN:  This is an 88-year-old male with past medical history of coronary artery diseas
e, who describes a fairly profound weight loss and progressive dysphagia for solid foods and a chroni
c left lower lobe pneumonia.  This likely relates to his chronic odynophagia and is likely reflective
 of chronic aspiration pneumonia.  Weight loss is likely multifactorial related to chronic infection 
as well as difficulty eating.

1.  Agree with coverage for typical pathogens seen in the oral cavity, strep and anaerobes, with Unas
yn.  Agree with transition to Augmentin for another 2 weeks, with followup imaging as an outpatient a
s well as symptoms.

2.  Would recommend evaluation of a swallowing study with video fluoroscopic swallow eval and an uppe
r GI due to degree of weight loss, would include upper GI.

3.  Also, in the differential, based on where he has lived, you could consider blastomycoses, coccidi
omycosis, as well as TB, due to his symptoms, but with clinical improvement on current antibiotic the
rapy, these additional etiologies seem less likely. 





Time was 80 minutes, greater than 50% of the time spent with education and counseling of the patient 
and his family regarding differential diagnoses, planned treatment, and evaluation of dysphagia for s
olid foods.  Also, coordination of care with primary team.  



Thank you for this consultation.  We are happy to see the patient in followup care as an outpatient.





Job #:  [id number]

## 2018-07-30 NOTE — ASMTCMCOM
CM Note

 

CM Note                       

Notes:

PAM spoke to CHELSEY Mello regarding d/c POC. Pt and his wife has lost a significant amount of weight 

over the last year. Pt would benefit from [facility]. PAM spoke to Mariza at [facility] 

and made a referral for pt and his wife. Mariza will call pts home to obtain more information to 

complete the referral process. CM to follow.







Plan: [facility]; RN, CNA, PT

 

Date Signed:  07/30/2018 11:43 AM

Electronically Signed By:YOUNG Maravilla

## 2018-07-30 NOTE — PDDCSUM
Discharge Summary


Discharge Summary: 


DISCHARGE SUMMARY





FOLLOW-UP ITEMS: 


Ongoing outpatient speech therapy reassessment





DATE OF ADMISSION:  7/26/2018


DATE OF DISCHARGE:  7/30/2018





DISCHARGE DIAGNOSES:


1.  Acute aspiration pneumonia present on admission


2. 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


3. Acute left lower lobe collapse


4. Chronic coronary artery disease


5. Chronic dysphagia


6. Paroxysmal atrial fibrillation 


7. Acute metabolic lactic acidosis


8. Sepsis present on admission 





CONSULTATIONS:


Pulmonary, speech therapy, infectious disease





PROCEDURES / IMAGING:


Bronchoscopy demonstrating mucus plugging, erythematous airways in the left 

lower lobe





CHIEF COMPLAINT:


Acute shortness of breath and cough





SUBJECTIVE:


Patient is feeling well at time discharge, he reports that he is feeling well 

and would like to be discharged home





PHYSICAL EXAM ON DISCHARGE:


Systolic blood pressure 100-130, heart rate 60, afebrile overnight, satting 

well on room air, alert awake oriented x3, no apparent distress, left lower 

lobe has inspiratory crackles but otherwise no expiratory wheezes or bronchial 

breath sounds throughout, heart rhythm is regular with a 2/6 systolic murmur at 

the sternum





LABS ON DISCHARGE:


White blood cell count 31903, hemoglobin 14, creatinine 0.7, lactic acid 1.7





HOSPITAL COURSE BY PROBLEM:


The patient presented with sepsis and aspiration pneumonia, evidenced by a Q 

sofa score of 2 with hypotension, tachypnea, and a clear source of infection 

notably aspiration pneumonia in the left lower lobe.  The patient had acute 

metabolic acidosis with a lactic acid level of 4.3 and received aggressive IV 

fluids as well as empiric IV antibiotics until his lactic acid level had 

cleared and his respiratory status had stabilized on supplemental oxygen.  He 

was seen by Pulmonary in consultation and a bronchoscopy was performed which 

demonstrated mucus plugging and erythematous airways representative of 

inflammation in the left lower lobe, most likely secondary to an infectious 

process.  The patient was seen in consultation by Infectious Disease and after 

further collaboration on 07/30, we pursued aggressive workup of the patient's 

chronic dysphagia, as we suspected that his dysphagia was significantly 

impacting his ability to clear secretions, resulting in significant weight loss 

with poor oral intake of solid foods, and placing him at high risk for 

aspiration pneumonia, which was our final determination as to the likely cause 

of the patient's left lower lobe airspace disease.  The patient underwent 

esophagram which resulted in obvious aspiration, and then he underwent a VFSS 

which demonstrated dysphagia but no overt aspiration.  Consequently, it was the 

conclusion of her speech therapist that the patient is experiencing an 

esophageal component to his dysphagia and he should receive ongoing outpatient 

speech therapy as well as an outpatient GI consultation to determine the exact 

etiology as to his esophageal dysmotility.  All these recommendations were 

presented to the patient and his family prior to discharge.  The patient will 

receive 14 days of subsequent Augmentin, which was a direct conversion from his 

IV Unasyn received during his hospitalization.  He will also come received as 

needed and Mucinex and as needed albuterol inhaler if he experiences any 

protracted coughing.





DISCHARGE MEDICATIONS:


Please see official discharge medication reconciliation sheet in chart , 

Augmentin 875 twice daily for 14 days, albuterol inhaler as needed, Mucinex as 

needed.  Continue all other home cardiac an atrial fibrillation medications.





DISCHARGE INSTRUCTIONS:


Please follow up with Dr. Destin Reyes as an outpatient, scheduled follow-up 

with Dr. Segun Haines as an outpatient, and continue working with home PT, OT

, speech therapist.  Please coordinate with primary care provider within the 

next 3-5 days.





TIME SPENT:


Greater than 30 minutes were spent on direct patient care, as well as discharge 

planning and preparation.

## 2018-07-30 NOTE — ASMTLACE
LACE

 

Length of stay for            Answers:  4-6 days                              

current admission                                                             

Acuity / Level of             Answers:  Yes                                   

Care: Did the patient                                                         

have an inpatient                                                             

admission?                                                                    

Comorbidities - select        Answers:  Coronary Artery Disease               

all that apply                                                                

                                        Other                         Notes:  HTN; AFib; HLD

# of Emergency department     Answers:  1-2                                   

visits in the last 6                                                          

months                                                                        

Score: 11

 

Date Signed:  07/30/2018 04:37 PM

Electronically Signed By:YOUNG Maravilla

## 2018-07-30 NOTE — PDINTPN
Intensivist Progress Note


Assessment/Plan: 








88 M with known pulmonary nodules and berylium exposure but stable until recent 

fall and rib fractures, admitted with LLL collapse and hypoxia. Underwent 

bronch 7/27 with mucous plugs and ab normal mucosa, but cultures unrevealing 

and path still pending. Failed esophagram with clear aspiration. 





* LLL collapse- may be related to aspiration with associated mucous plugging. 

Video esophagram pending? Will see Dr. Reyes as outpatient 1-2 weeks




















Subjective: 





Feels OK, getting dc soon.


Objective: 





 Vital Signs











Temp Pulse Resp BP Pulse Ox


 


 37.1 C   69   16   97/59 L  95 


 


 07/30/18 08:00  07/30/18 10:19  07/30/18 10:19  07/30/18 08:14  07/30/18 10:25








 Microbiology











 07/27/18 15:30 Gram Stain - Final





 Lung Left Lower Lobe - Bronchial Washings Bronchial Washings Culture - Final





    Candida Albicans








 Laboratory Results





 07/30/18 04:59 





 07/30/18 06:44 





 











 07/29/18 07/30/18 07/31/18





 05:59 05:59 05:59


 


Intake Total 420 300 


 


Output Total  100 


 


Balance 420 200 








 











PT  15.2 SEC (12.0-15.0)  H  07/26/18  14:45    


 


INR  1.18  (0.83-1.16)  H  07/26/18  14:45    














Physical Exam





- Physical Exam


General Appearance: alert, no apparent distress, thin


EENT: PERRL/EOMI


Neck: supple


Respiratory: lungs clear, decreased breath sounds (left base), No respiratory 

distress, No accessory muscle use


Cardiac/Chest: regular rate, rhythm, No edema


Abdomen: normal bowel sounds, non-tender, soft, No distended


Skin: normal color, warm/dry, No cyanosis


Lymphatic: no adenopathy


Extremities: No pedal edema


Neuro/Psych: alert, normal mood/affect, oriented x 3





ICD10 Worksheet


Patient Problems: 


 Problems











Problem Status Onset


 


Chest congestion Acute  


 


Collapsed lung Acute  


 


Sepsis Acute  


 


Weakness Acute  


 


Weight loss Acute  


 


Cellulitis and abscess of foot Acute  


 


Chest pain Acute  


 


Fall Acute  


 


Rib contusion Acute  


 


Rib fractures Acute

## 2018-07-30 NOTE — PDIAF
- Diagnosis


Diagnosis: Aspiration Pneumonia, Dysphagia


Code Status: Full Code





- Medication Management


Discharge Medications: 


 Medications to Continue on Transfer





Aspirin [Aspirin 325 mg (*)] 325 mg PO DAILY 07/26/18 [Last Taken 07/26/18]


Atorvastatin Calcium [Lipitor 40 mg (*)] 40 mg PO DAILY@17 07/26/18 [Last Taken 

07/25/18]


Calcium/D3/Mag Ox//Abel/Zn [Caltrate+D3 Plus Mineral Minis] 1 each PO DAILY 

07/26/18 [Last Taken 07/26/18]


Carvedilol [Coreg (*)] 6.25 mg PO BIDMEAL 07/26/18 [Last Taken 07/26/18]


Digoxin 250 mcg PO DAILY@17 07/26/18 [Last Taken 07/25/18]


Fluticasone Hfa 220 Mcg [Flovent 220 MCG Hfa MDI (*)] 2 puffs IH BID 07/26/18 [

Last Taken 07/26/18]


Ibuprofen [Motrin (*)] 200 mg PO HS PRN 07/26/18 [Last Taken Unknown]


Multivitamins [Multivitamin (*)] 1 each PO DAILY 07/26/18 [Last Taken 07/26/18]


Omeprazole 20 mg PO DAILY@17 07/26/18 [Last Taken 07/25/18]


Sennosides/Docusate Sodium [Senna-Docusate Sodium Tablet] 1 each PO PRN PRN 07/ 26/18 [Last Taken Unknown]


Tamsulosin HCl [Flomax 0.4 MG (*)] 0.4 mg PO DAILY@17 07/26/18 [Last Taken 07/25 /18]


Acetaminophen [Tylenol 325mg (*)] 650 mg PO Q4HRS PRN  tab 07/30/18 [Last Taken 

Unknown]


Albuterol [Proventil Inhaler HFA (*)] 2 puffs IH Q4 PRN #1 mdi 07/30/18 [Last 

Taken Unknown]


Amoxicillin/Clavulanate Pot [Augmentin 875 MG TAB (*)] 875 mg PO BID #28 tab 07/ 30/18 [Last Taken Unknown]


Lidocaine 4%/Menthol 1% [Icy Hot Lidocaine/Menthol 4%/1% Patch (*)] 1 patch TD 

DAILY #30 patch 07/30/18 [Last Taken Unknown]


guaiFENesin [Mucinex 600 MG (*)] 1,200 mg PO BID #20 tab.er 07/30/18 [Last 

Taken Unknown]








Long Term Antibiotics: Augmentin 875mg PO bid


Long Term Antibiotic Stop Date: 08/13/18


Discharge Medications: Refer to the Discharge Home Medication list for PRN 

reason.


PICC Care - Routine: N/A





- Orders


Services needed: Home Care, Registered Nurse, Certified Nursing Aide, Physical 

Therapy, Occupational Therapy, Speech Language Pathologist


Home Care Face to Face: I certify that this patient was under my care and that 

I had the required face-to-face encounter meeting the encounter requirements on 

the discharge day.  My findings support the fact that the patient is homebound 

as defined in


Home Care Face to Face Continued: CMS Chapter 7 Medicare Benefits Manual 30.1.1

, The condition of the patient is such that there exists a normal inability to 

leave home and consequently, leaving home would require a considerable and 

taxing effort.


Isolation Type: None


Oxygen: NA


Diet Texture: Regular Texture Diet, Thin Liquids, Meds Whole in Puree


Weigh Patient: weekly


Gordon: Not applicable





- Labs/Radiology


CBC w/diff Date: 08/06/18


CMP Date: 08/06/18


Call or Fax Lab and Imaging Results to: Dr. Juan Diego Vines





- Follow Up Care


Current Providers and Referrals: 


Patient,NotPresent [Unknown] - As per Instructions


Wellington Adamson MD [Primary Care Provider] - 3-5 days


Destin Reyes MD [Medical Doctor] - follow up in 1 week


Segun Haines MD [Medical Doctor] - follow up in 1 week

## 2018-07-30 NOTE — ASMTDCNOTE
Case Management Discharge

 

Discharge Order Complete?     Answers:  Yes                                   

Patient to Obtain             Answers:  via Family                            

Medications                                                                   

Transportation Arranged       Answers:  Family/Friends                        

[facility] Complete               Answers:  No                                    

Case Management Transport     Answers:  No                                    

Form Complete                                                                 

Faxed Final Orders            Answers:  Yes                                   

Agency/Facility Transfer      Answers:  Yes                                   

Report Printed & Faxed to                                                     

Receiving Agency                                                              

Family Notified               Answers:  Yes                                   

Discharge Comments            

Notes:

Pts case discussed w/ Dr. Villalba and CHELSEY Mello regarding d/c POC. Pt is being discharged 

today. SPL is recommending a thickener. CM notified [facility] of the d/c. DC orders sent. [facility] 

reports that their resumption of care is typically 24-48hrs. CM informed [facility] that pts son in 

law Darrion would like to be the . CM set pt up w/ [facility]. CM available for 

changes.







Plan: [facility]; PT, OT, RN, SPL, CNA

 

Date Signed:  07/30/2018 04:43 PM

Electronically Signed By:YOUNG Maravilla

## 2018-07-31 NOTE — ASDISCHSUM
----------------------------------------------

Discharge Information

----------------------------------------------

Plan Status:Home with Home Health                    Medically Cleared to Leave:07/31/2018

Discharge Date:07/30/2018 07:54 PM                    D/C Disposition:Home Health Service

[facility] D/C Disposition:[facility]                        Projected Discharge Date:07/31/2018 11:00 AM

Transportation at D/C:                               Discharge Delay Reason:

Follow-Up Date:07/31/2018 11:00 AM                   Discharge Slot:

Final Diagnosis:

----------------------------------------------

Placement Information

----------------------------------------------

Referral Type:*Home Health Care Services             Referral ID:[id number]

Provider Name:[facility]

Address 1:[address]                    Phone [phone]

Address 2:                                           Fax [phone]

City:[place]                                       Selection Factors:

State:[place]

 

----------------------------------------------

Patient Contact Information

----------------------------------------------

Contact Name:ZORAIDA                            Relationship:Wife

Address:[address]                             Home [phone]

                                                     Work Phone:

City:[place]                                         Alternate Phone:

State/Zip Code:[place] [address]                              Email:

----------------------------------------------

Financial Information

----------------------------------------------

Financial Class:Medicare Advantage Plans

Primary Plan Desc:[employer] [place]                 Primary Plan Number:[id number]

Secondary Plan Desc:                                 Secondary Plan Number:

 

 

----------------------------------------------

Assessment Information

----------------------------------------------

----------------------------------------------

LACE

----------------------------------------------

LACE

 

Length of stay for            Answers:  4-6 days                              

current admission                                                             

Acuity / Level of             Answers:  Yes                                   

Care: Did the patient                                                         

have an inpatient                                                             

admission?                                                                    

Comorbidities - select        Answers:  Coronary Artery Disease               

all that apply                                                                

                                        Other                         Notes:  HTN; AFib; HLD

# of Emergency department     Answers:  1-2                                   

visits in the last 6                                                          

months                                                                        

Score: 11

 

Date Signed:  07/30/2018 04:37 PM

Electronically Signed By:YOUNG Maravilla

 

 

----------------------------------------------

[facility] CM Progress Note

----------------------------------------------

CM Note

 

CM Note                       

Notes:

This CM met with patient's LEIGH Maier [phone] in the ER yesterday prior to patient's 

admission.  Darrion confirms that patient and his wife do live alone and [facility]  Darrion and Tina (pt's daughter) [phone] live "down the road" and 


do check in with patient often.



Today I [facility] [phone] and have confirmed that patient is active 

with PT/RN/CNA HH services.



CM to follow.

 

Date Signed:  07/27/2018 10:40 AM

Electronically Signed By:Ariana Garcia RN

 

 

----------------------------------------------

[facility] CM Progress Note

----------------------------------------------

CM Note

 

CM Note                       

Notes:

Met with patient, his wife and son in law. Per the son in law the couple has been struggling to 

meet all needs at home on their own and n fact had HHC set up to start but he unfortunately was 

admitted here, I have placed a referral to [facility]. The family has resources for other area 

agencies to assist with other needs. CM to follow.



Plan: Home with [facility] when medically cleared for discharge to home.

 

Date Signed:  07/27/2018 11:09 AM

Electronically Signed By:Lillian Fields RN

 

 

----------------------------------------------

[facility] CM Progress Note

----------------------------------------------

CM Note

 

CM Note                       

Notes:

Spoke w/pt's wife and son in law Darrion re; dc poc. They want pt to dc home w/home care, pt already 

set up with [facility] (RN/PT/CNA). Son in law states they are also looking into arranging private pay 


non skilled assistance as well, CM gave him a brochure for [facility]. [facility] notified that pt may dc 

Sunday.



DC Plan: Home care/ [facility] (RN/PT/CNA)

 

Date Signed:  07/28/2018 12:24 PM

Electronically Signed By:Esther Green RN

 

 

----------------------------------------------

[facility] Progress Note

----------------------------------------------

CM Note

 

CM Note                       

Notes:

PAM spoke to CHELSEY Mello regarding d/c POC. Pt and his wife has lost a significant amount of weight 

over the last year. Pt would benefit from [facility]. PAM spoke to Mariza at [facility] 

and made a referral for pt and his wife. Mariza will call pts home to obtain more information to 

complete the referral process. CM to follow.







Plan: [facility]; RN, CNA, PT

 

Date Signed:  07/30/2018 11:43 AM

Electronically Signed By:YOUNG Maravilla

 

 

----------------------------------------------

Case Management Discharge Plan Note

----------------------------------------------

Case Management Discharge

 

Discharge Order Complete?     Answers:  Yes                                   

Patient to Obtain             Answers:  via Family                            

Medications                                                                   

Transportation Arranged       Answers:  Family/Friends                        

[facility] Complete               Answers:  No                                    

Case Management Transport     Answers:  No                                    

Form Complete                                                                 

Faxed Final Orders            Answers:  Yes                                   

Agency/Facility Transfer      Answers:  Yes                                   

Report Printed & Faxed to                                                     

Receiving Agency                                                              

Family Notified               Answers:  Yes                                   

Discharge Comments            

Notes:

Pts case discussed w/ Dr. Villalab and CHELSEY Mello regarding d/c POC. Pt is being discharged 

today. SPL is recommending a thickener. CM notified [facility] of the d/c. DC orders sent. [facility] 

reports that their resumption of care is typically 24-48hrs. CM informed [facility] that pts son in 

law Darrion would like to be the . CM set pt up w/ [facility]. CM available for 

changes.







Plan: [facility]; PT, OT, RN, SPL, CNA

 

Date Signed:  07/30/2018 04:43 PM

Electronically Signed By:YOUNG Maravilla

 

 

----------------------------------------------

[facility] Information

----------------------------------------------

## 2018-10-13 ENCOUNTER — HOSPITAL ENCOUNTER (EMERGENCY)
Dept: HOSPITAL 80 - FED | Age: 83
Discharge: HOME | End: 2018-10-13
Payer: COMMERCIAL

## 2018-10-13 VITALS — SYSTOLIC BLOOD PRESSURE: 128 MMHG | DIASTOLIC BLOOD PRESSURE: 74 MMHG
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
## 2018-10-13 DIAGNOSIS — I48.91: ICD-10-CM
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
## 2018-10-13 DIAGNOSIS — Z95.2: ICD-10-CM
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
## 2018-10-13 DIAGNOSIS — I44.7: ICD-10-CM

## 2018-10-13 DIAGNOSIS — S01.01XA: Primary | ICD-10-CM

## 2018-10-13 DIAGNOSIS — Z79.02: ICD-10-CM

## 2018-10-13 LAB
INR PPP: 1.14 (ref 0.83–1.16)
PLATELET # BLD: 452 10^3/UL (ref 150–400)
PROTHROMBIN TIME: 14.8 SEC (ref 12–15)

## 2018-10-13 PROCEDURE — 0HQ0XZZ REPAIR SCALP SKIN, EXTERNAL APPROACH: ICD-10-PCS | Performed by: EMERGENCY MEDICINE

## 2018-10-13 NOTE — CPEKG
Test Reason : OPEN

Blood Pressure : ***/*** mmHG

Vent. Rate : 056 BPM     Atrial Rate : 000 BPM

   P-R Int : 253 ms          QRS Dur : 151 ms

    QT Int : 453 ms       P-R-T Axes : 000 -52 121 degrees

   QTc Int : 438 ms

 

Sinus rhythm

Prolonged CT interval

Probable left atrial enlargement

Left bundle branch block

 

Confirmed by Bj Huntley ([id number]) on 10/13/2018 8:19:43 AM

 

Referred By:             Confirmed By:Bj Huntley

## 2018-10-13 NOTE — EDPHY
H & P


Time Seen by Provider: 10/13/18 02:41


HPI/ROS: 





HPI





CHIEF COMPLAINT:  Possible mechanical trip and fall, left scalp hematoma and 

laceration on Plavix





HISTORY OF PRESENT ILLNESS:  88-year-old male, presents emergency room by EMS 

from [facility].  Possibly had a mechanical trip and fall however patient is 

not red exactly recall.  He fell backwards had head strike.  Sustained a left 

posterior occiput hematoma and laceration.  Patient complains of a headache.  

He is on Plavix.  Denies chest pain or shortness of breath.


Of note patient is rolled in hospice.


Hospice is at bedside present.





Of note I did review his discharge summary from the end of September.  Where he 

had empyema aspiration pneumonia with a chest tube.  He wished to enter hospice 

at [facility].





This evening he would like to go home.  He does not want to be admitted.





His head laceration will be repaired.








Past Medical History:  Hypertension, coronary disease, CABG, hypertension, 

aortic valve replacement, AFib, aspiration pneumonia





Past Surgical History:  No recent surgery





Social History:  The resides at [facility].  Enrolled in





Family History:  Noncontributory.








ROS   


REVIEW OF SYSTEMS:


10 Systems were reviewed and negative with the exception of the elements 

mentioned in the history of present illness.








Exam   


Constitutional   triage nursing summary reviewed, vital signs reviewed, awake/

alert. 


Eyes   normal conjunctivae and sclera, EOMI, PERRLA. 


HENT  head/neck; left posterior occiput hematoma and laceration.  10 cm left 

parietal occipital laceration present.


 moist mucus membranes, no epistaxis, neck supple/ no meningismus, no raccoon 

eyes. 


Respiratory   clear to auscultation bilaterally, normal breath sounds, no 

respiratory distress, no wheezing. 


Cardiovascular   rate normal, regular rhythm, no murmur, no edema, distal 

pulses normal. 


Gastrointestinal   soft, non-tender, no rebound, no guarding, normal bowel 

sounds, no distension, no pulsatile mass. 


Genitourinary   no CVA tenderness. 


Musculoskeletal  chronic lower extremity edema bilaterally, no midline 

vertebral tenderness, full range of motion, no calf swelling, no tenderness of 

extremities, no meningismus, good pulses, neurovascularly intact.


Skin   pink, warm, & dry, no rash, skin atraumatic. 


Neurologic   awake, alert and oriented x 3, AAOx3, moves all 4 extremities 

equally, motor intact, sensory intact, CN II-XII intact, normal cerebellar, 

normal vision, normal speech. 


Psychiatric   normal mood/affect. 


Heme/Lymph/Immune   no lymphadenopathy.





Differential Diagnosis:  Includes but is not limited to in a particular order 

closed-head injury, intracranial bleed, skull fracture, scalp hematoma.  

Laceration.





Medical Decision Making:  Plan for this patient CT scan head without contrast 

and CT cervical spine without contrast for trauma.  Given scalp hematoma 

laceration.  Basic blood work.





Re-evaluation:








EKG interpretation by me on record in [employer] system.  Impression time of 

EKG 3:11 a.m., sinus rhythm rate of 56, left bundle-branch block present.  When 

compared to the patient's old EKG is very similar in appearance.  No acute 

ischemic changes appreciated or signs of cardiac arrhythmia.








CT scan head without contrast and CT cervical spine without contrast for trauma 

are negative for acute traumatic injury.  Called to me by Dr. Jensen.





Laceration Repair Procedure: Verbal Consent was obtained, Under sterile 

conditions,  left parietal occipital 10 cm laceration   The wound was copiously 

irrigated with sterile fluid, the wound was explored for foreign bodies there 

were none visualized, the wound was explored with a sterile glove to the base.  

There are no deep structures involved, including no arterial injury.   THIRTEEN 

ELLEN were placed in this patient's laceration.  He had good close 

approximation of the wound edges.  He Tolerated this well. 








0441:  Patient tolerated staple placement very well.  Plan will be for staples 

removed in 7 days.





Patient is eager to be discharged home.  He feels well.  Denies any complaints 

at this time.





CT scan of his head and neck without contrast for trauma were negative.





Chest x-ray reviewed.  He is in his left lung feared similar to previous chest x

-ray.  At the end of September recently had a aspiration pneumonia, empyema 

with chest tube.








0455:  Patient ambulated well throughout the emergency room.  Stable gait with 

a walker.  Patient like to be discharged back to [facility].  Wife at 

bedside.  Does not want to be admitted.  Laceration repaired.  Staples need to 

be removed in 7 days.  He understands.


Source: Patient, EMS





- Personal History


Tetanus Vaccine Date: 2005





- Medical/Surgical History


Hx Asthma: Yes


Hx Chronic Respiratory Disease: No


Hx Diabetes: No


Hx Cardiac Disease: Yes


Hx Renal Disease: No


Hx Cirrhosis: No


Hx Alcoholism: No


Hx HIV/AIDS: No


Hx Splenectomy or Spleen Trauma: No


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 

Sciatica, Asthma,





- Social History


Smoking Status: Never smoked


Constitutional: 


 Initial Vital Signs











Temperature (C)  36.6 C   10/13/18 02:45


 


Heart Rate  61   10/13/18 02:45


 


Respiratory Rate  16   10/13/18 02:45


 


Blood Pressure  123/73 H  10/13/18 02:45


 


O2 Sat (%)  90 L  10/13/18 02:45








 











O2 Delivery Mode               Room Air


 


O2 (L/minute)                  2














Allergies/Adverse Reactions: 


 





codeine Allergy (Intermediate, Verified 10/13/18 02:50)


 








Home Medications: 














 Medication  Instructions  Recorded


 


Aspirin [Aspirin 325 mg (*)] 325 mg PO DAILY 07/26/18


 


Atorvastatin Calcium [Lipitor 40 40 mg PO DAILY@17 07/26/18





mg (*)]  


 


Calcium/D3/Mag Ox//Abel/Zn 1 each PO DAILY 07/26/18





[Caltrate+D3 Plus Mineral Minis]  


 


Carvedilol [Coreg (*)] 6.25 mg PO BIDMEAL 07/26/18


 


Digoxin 250 mcg PO DAILY@17 07/26/18


 


Multivitamins [Multivitamin (*)] 1 each PO DAILY 07/26/18


 


Omeprazole 20 mg PO DAILY@17 07/26/18


 


Sennosides/Docusate Sodium 1 each PO PRN PRN 07/26/18





[Senna-Docusate Sodium Tablet]  


 


Tamsulosin HCl [Flomax 0.4 MG (*)] 0.4 mg PO DAILY@17 07/26/18


 


Acetaminophen [Tylenol 325mg (*)] 650 mg PO Q4HRS PRN  tab 07/30/18


 


Albuterol [Proventil Inhaler HFA 2 puffs IH Q4 PRN #1 mdi 07/30/18





(*)]  


 


Clopidogrel Bisulfate [Clopidogrel] 75 mg PO DAILY 09/20/18


 


Fluticasone Hfa 220 Mcg [Flovent 1 puffs IH BID 09/20/18





220 MCG Hfa MDI (*)]  


 


guaiFENesin [Mucinex 600 MG (*)] 1,200 mg PO BID PRN 09/20/18


 


Amoxicillin/Clavulanate Pot 875 mg PO BID #14 tab 09/28/18





[Augmentin 875 MG TAB (*)]  














Medical Decision Making





- Data Points


Laboratory Results: 


 Laboratory Results





 10/13/18 03:12 





 10/13/18 03:12 





 











  10/13/18 10/13/18 10/13/18





  03:20 03:12 03:12


 


WBC      





    


 


RBC      





    


 


Hgb      





    


 


Hct      





    


 


MCV      





    


 


MCH      





    


 


MCHC      





    


 


RDW      





    


 


Plt Count      





    


 


MPV      





    


 


Neut % (Auto)      





    


 


Lymph % (Auto)      





    


 


Mono % (Auto)      





    


 


Eos % (Auto)      





    


 


Baso % (Auto)      





    


 


Nucleat RBC Rel Count      





    


 


Absolute Neuts (auto)      





    


 


Absolute Lymphs (auto)      





    


 


Absolute Monos (auto)      





    


 


Absolute Eos (auto)      





    


 


Absolute Basos (auto)      





    


 


Absolute Nucleated RBC      





    


 


Immature Gran %      





    


 


Immature Gran #      





    


 


Platelet Estimate      





    


 


Polychromasia      





    


 


Oval Macrocytes      





    


 


Echinocytes      





    


 


Elliptocytes      





    


 


Acanthocytes (Spur)      





    


 


Keratocytes      





    


 


Schistocytes      





    


 


PT      14.8 SEC SEC





     (12.0-15.0) 


 


INR      1.14 





     (0.83-1.16) 


 


APTT      41.7 SEC H SEC





     (23.0-38.0) 


 


Sodium    133 mEq/L L mEq/L  





    (135-145)  


 


Potassium    4.7 mEq/L mEq/L  





    (3.3-5.0)  


 


Chloride    100 mEq/L mEq/L  





    ()  


 


Carbon Dioxide    26 mEq/l mEq/l  





    (22-31)  


 


Anion Gap    7 mEq/L mEq/L  





    (6-14)  


 


BUN    19 mg/dL mg/dL  





    (7-23)  


 


Creatinine    0.9 mg/dL mg/dL  





    (0.7-1.3)  


 


Estimated GFR    > 60   





    


 


Glucose    105 mg/dL H mg/dL  





    ()  


 


Calcium    8.8 mg/dL mg/dL  





    (8.5-10.4)  


 


POC Troponin I  0.03 ng/mL ng/mL    





   (0.00-0.08)   














  10/13/18





  03:12


 


WBC  11.17 10^3/uL H 10^3/uL





   (3.80-9.50) 


 


RBC  3.60 10^6/uL L 10^6/uL





   (4.40-6.38) 


 


Hgb  9.2 g/dL L g/dL





   (13.7-17.5) 


 


Hct  29.1 % L %





   (40.0-51.0) 


 


MCV  80.8 fL L fL





   (81.5-99.8) 


 


MCH  25.6 pg L pg





   (27.9-34.1) 


 


MCHC  31.6 g/dL L g/dL





   (32.4-36.7) 


 


RDW  22.7 % H %





   (11.5-15.2) 


 


Plt Count  452 10^3/uL H 10^3/uL





   (150-400) 


 


MPV  8.5 fL L fL





   (8.7-11.7) 


 


Neut % (Auto)  70.1 % %





   (39.3-74.2) 


 


Lymph % (Auto)  11.7 % L %





   (15.0-45.0) 


 


Mono % (Auto)  12.4 % %





   (4.5-13.0) 


 


Eos % (Auto)  3.5 % %





   (0.6-7.6) 


 


Baso % (Auto)  0.6 % %





   (0.3-1.7) 


 


Nucleat RBC Rel Count  0.0 % %





   (0.0-0.2) 


 


Absolute Neuts (auto)  7.83 10^3/uL H 10^3/uL





   (1.70-6.50) 


 


Absolute Lymphs (auto)  1.31 10^3/uL 10^3/uL





   (1.00-3.00) 


 


Absolute Monos (auto)  1.38 10^3/uL H 10^3/uL





   (0.30-0.80) 


 


Absolute Eos (auto)  0.39 10^3/uL 10^3/uL





   (0.03-0.40) 


 


Absolute Basos (auto)  0.07 10^3/uL 10^3/uL





   (0.02-0.10) 


 


Absolute Nucleated RBC  0.00 10^3/uL 10^3/uL





   (0-0.01) 


 


Immature Gran %  1.7 % H %





   (0.0-1.1) 


 


Immature Gran #  0.19 10^3/uL H 10^3/uL





   (0.00-0.10) 


 


Platelet Estimate  INCREASED  H 





   (ADEQ) 


 


Polychromasia  1+  H 





  


 


Oval Macrocytes  1+  H 





  


 


Echinocytes  1+  H 





  


 


Elliptocytes  1+  H 





  


 


Acanthocytes (Spur)  1+  H 





  


 


Keratocytes  1+  H 





  


 


Schistocytes  1+  H 





  


 


PT  





  


 


INR  





  


 


APTT  





  


 


Sodium  





  


 


Potassium  





  


 


Chloride  





  


 


Carbon Dioxide  





  


 


Anion Gap  





  


 


BUN  





  


 


Creatinine  





  


 


Estimated GFR  





  


 


Glucose  





  


 


Calcium  





  


 


POC Troponin I  





  











Point of Care Test Results: 


 Chemistry











  10/13/18





  03:20


 


POC Troponin I  0.03 ng/mL ng/mL





   (0.00-0.08) 














Departure





- Departure


Disposition: Home, Routine, Self-Care


Clinical Impression: 


Fall


Qualifiers:


 Encounter type: initial encounter Qualified Code(s): W19.XXXA - Unspecified 

fall, initial encounter





Scalp laceration


Qualifiers:


 Encounter type: initial encounter Qualified Code(s): S01.01XA - Laceration 

without foreign body of scalp, initial encounter





Condition: Good


Instructions:  Laceration (ED), Fall Prevention for Older Adults (ED), Staple 

Care (ED), Fall Prevention (ED)


Additional Instructions: 


1. Staples need to be removed in 7 days.


2. Return to the emergency room if you have worsening symptoms questions or 

concerns.


Referrals: 


Patient,NotPresent [Unknown] - As per Instructions